# Patient Record
Sex: FEMALE | Race: WHITE | HISPANIC OR LATINO
[De-identification: names, ages, dates, MRNs, and addresses within clinical notes are randomized per-mention and may not be internally consistent; named-entity substitution may affect disease eponyms.]

---

## 2021-02-07 ENCOUNTER — FORM ENCOUNTER (OUTPATIENT)
Age: 71
End: 2021-02-07

## 2021-02-08 ENCOUNTER — TRANSCRIPTION ENCOUNTER (OUTPATIENT)
Age: 71
End: 2021-02-08

## 2021-02-08 PROBLEM — Z00.00 ENCOUNTER FOR PREVENTIVE HEALTH EXAMINATION: Status: ACTIVE | Noted: 2021-02-08

## 2021-02-09 ENCOUNTER — APPOINTMENT (OUTPATIENT)
Dept: DISASTER EMERGENCY | Facility: HOSPITAL | Age: 71
End: 2021-02-09

## 2021-02-09 ENCOUNTER — OUTPATIENT (OUTPATIENT)
Dept: INPATIENT UNIT | Facility: HOSPITAL | Age: 71
LOS: 1 days | End: 2021-02-09
Payer: SELF-PAY

## 2021-02-09 VITALS
OXYGEN SATURATION: 95 % | RESPIRATION RATE: 18 BRPM | TEMPERATURE: 98 F | DIASTOLIC BLOOD PRESSURE: 69 MMHG | SYSTOLIC BLOOD PRESSURE: 123 MMHG | HEART RATE: 60 BPM

## 2021-02-09 VITALS
HEART RATE: 84 BPM | SYSTOLIC BLOOD PRESSURE: 155 MMHG | HEIGHT: 62 IN | RESPIRATION RATE: 18 BRPM | WEIGHT: 160.06 LBS | TEMPERATURE: 98 F | OXYGEN SATURATION: 97 % | DIASTOLIC BLOOD PRESSURE: 80 MMHG

## 2021-02-09 DIAGNOSIS — U07.1 COVID-19: ICD-10-CM

## 2021-02-09 PROCEDURE — M0243: CPT

## 2021-02-09 RX ORDER — SODIUM CHLORIDE 9 MG/ML
250 INJECTION INTRAMUSCULAR; INTRAVENOUS; SUBCUTANEOUS
Refills: 0 | Status: DISCONTINUED | OUTPATIENT
Start: 2021-02-09 | End: 2021-02-23

## 2021-02-09 RX ADMIN — SODIUM CHLORIDE 25 MILLILITER(S): 9 INJECTION INTRAMUSCULAR; INTRAVENOUS; SUBCUTANEOUS at 09:51

## 2021-02-09 NOTE — CHART NOTE - NSCHARTNOTEFT_GEN_A_CORE
CC: Monoclonal Antibody Infusion/COVID 19 Positive  70y Female with a PMHx of HTN. Tested positive for COVID 2/8/21 with symptom onset 2/7/21. C/o cough, HA, and fatigue. She endorses receiving her first dose of the Pfizer vaccine on 1/22/21. She presents for monoclonal antibody infusion referred by her PMD.    exam/findings:  T(C): 36.5 (02-09-21 @ 09:23), Max: 36.5 (02-09-21 @ 09:23)  HR: 84 (02-09-21 @ 09:23) (84 - 84)  BP: 155/80 (02-09-21 @ 09:23) (155/80 - 155/80)  RR: 18 (02-09-21 @ 09:23) (18 - 18)  SpO2: 97% (02-09-21 @ 09:23) (97% - 97%)      PE:   Appearance: NAD	  HEENT:   Normal oral mucosa,   Lymphatic: No lymphadenopathy  Cardiovascular: Normal S1 S2, No JVD, No murmurs, No edema  Respiratory: Lungs clear to auscultation	  Gastrointestinal:  Soft, Non-tender, + BS	  Skin: warm and dry  Neurologic: Non-focal  Extremities: Normal range of motion,    ASSESSMENT:  Pt is a 70 year old female, Covid +  referred by Dr. Laurent who presents to infusion center for Monoclonal antibody infusion (Casirivimab/Imdevimab).   Symptoms/ Criteria: cough, HA, and fatigue  Risk Profile includes: age > 65    PLAN:  - infusion procedure explained to patient   -Consent for monoclonal antibody infusion obtained   - Risk & benifits discussed/all questions answered  -infuse  Casirivimab/Imdevimab 1200mg/1200mg IV over one hour   -observe patient for one hour post infusion        I have reviewed the Casirivimab/Imdevimab Emergency Use Authorization (EAU) and I have provided the patient or patient's caregiver with the following information:  1. FDA has authorized emergency use of Casirivimab/Imdevimab, which is not FDA-approved biologic product.  2. The patient or patient's caregiver has the option to accept or refuse administration of Casirivimab/Imdevimab  3. The significant known and benefits are unknown.  4. Information on available alternative treatments and risks and benefits of those alternatives.    Discharge:  Patient tolerated infusion well denies complaints of chest pain/SOB/dizziness/ palps  Vital signs --- for discharge home ---  D/C instructions given/ fact sheet included.  Patient to follow-up with PCP as needed. CC: Monoclonal Antibody Infusion/COVID 19 Positive  70y Female with a PMHx of HTN. Tested positive for COVID 2/8/21 with symptom onset 2/7/21. C/o cough, HA, and fatigue. She endorses receiving her first dose of the Pfizer vaccine on 1/22/21. She presents for monoclonal antibody infusion referred by her PMD.    exam/findings:  T(C): 36.5 (02-09-21 @ 09:23), Max: 36.5 (02-09-21 @ 09:23)  HR: 84 (02-09-21 @ 09:23) (84 - 84)  BP: 155/80 (02-09-21 @ 09:23) (155/80 - 155/80)  RR: 18 (02-09-21 @ 09:23) (18 - 18)  SpO2: 97% (02-09-21 @ 09:23) (97% - 97%)      PE:   Appearance: NAD	  HEENT:   Normal oral mucosa,   Lymphatic: No lymphadenopathy  Cardiovascular: Normal S1 S2, No JVD, No murmurs, No edema  Respiratory: Lungs clear to auscultation	  Gastrointestinal:  Soft, Non-tender, + BS	  Skin: warm and dry  Neurologic: Non-focal  Extremities: Normal range of motion,    ASSESSMENT:  Pt is a 70 year old female, Covid +  referred by Dr. Laurent who presents to infusion center for Monoclonal antibody infusion (Casirivimab/Imdevimab).   Symptoms/ Criteria: cough, HA, and fatigue  Risk Profile includes: age > 65    PLAN:  - infusion procedure explained to patient   -Consent for monoclonal antibody infusion obtained   - Risk & benefits discussed/all questions answered  -infuse  Casirivimab/Imdevimab 1200mg/1200mg IV over one hour   -observe patient for one hour post infusion        I have reviewed the Casirivimab/Imdevimab Emergency Use Authorization (EAU) and I have provided the patient or patient's caregiver with the following information:  1. FDA has authorized emergency use of Casirivimab/Imdevimab, which is not FDA-approved biologic product.  2. The patient or patient's caregiver has the option to accept or refuse administration of Casirivimab/Imdevimab  3. The significant known and benefits are unknown.  4. Information on available alternative treatments and risks and benefits of those alternatives.    Discharge:  Patient tolerated infusion well denies complaints of chest pain/SOB/dizziness/ palps  Vital signs stable for discharge home 1200.  D/C instructions given/ fact sheet included.  Patient to follow-up with PCP as needed.

## 2021-02-10 ENCOUNTER — TRANSCRIPTION ENCOUNTER (OUTPATIENT)
Age: 71
End: 2021-02-10

## 2022-11-14 ENCOUNTER — OFFICE (OUTPATIENT)
Dept: URBAN - METROPOLITAN AREA CLINIC 84 | Facility: CLINIC | Age: 72
Setting detail: OPHTHALMOLOGY
End: 2022-11-14
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
## 2022-11-14 PROCEDURE — NO SHOW FE NO SHOW FEE: Performed by: OPHTHALMOLOGY

## 2022-12-29 ENCOUNTER — OFFICE (OUTPATIENT)
Dept: URBAN - METROPOLITAN AREA CLINIC 93 | Facility: CLINIC | Age: 72
Setting detail: OPHTHALMOLOGY
End: 2022-12-29
Payer: COMMERCIAL

## 2022-12-29 ENCOUNTER — RX ONLY (RX ONLY)
Age: 72
End: 2022-12-29

## 2022-12-29 DIAGNOSIS — H26.492: ICD-10-CM

## 2022-12-29 DIAGNOSIS — H11.823: ICD-10-CM

## 2022-12-29 DIAGNOSIS — H02.89: ICD-10-CM

## 2022-12-29 DIAGNOSIS — H40.1121: ICD-10-CM

## 2022-12-29 DIAGNOSIS — H16.223: ICD-10-CM

## 2022-12-29 PROCEDURE — 99213 OFFICE O/P EST LOW 20 MIN: CPT | Performed by: OPHTHALMOLOGY

## 2022-12-29 PROCEDURE — 92133 CPTRZD OPH DX IMG PST SGM ON: CPT | Performed by: OPHTHALMOLOGY

## 2022-12-29 ASSESSMENT — REFRACTION_AUTOREFRACTION
OD_CYLINDER: -1.00
OS_SPHERE: +1.00
OS_AXIS: 036
OS_CYLINDER: -2.25
OD_SPHERE: +0.50
OD_AXIS: 124

## 2022-12-29 ASSESSMENT — DYE DISAPPEARANCE TEST (DDT)
OD_DDT: 9:23 AM 9:26AM9.28 AM
OS_DDT: 9:23 AM 9:26AM9.28 AM

## 2022-12-29 ASSESSMENT — DRY EYES - PHYSICIAN NOTES
OS_GENERALCOMMENTS: MODERATE STAINING
OD_GENERALCOMMENTS: MODERATE STAINING

## 2022-12-29 ASSESSMENT — KERATOMETRY
OD_K2POWER_DIOPTERS: 500145
OS_K2POWER_DIOPTERS: 44.50
OD_AXISANGLE_DEGREES: 091
OS_AXISANGLE_DEGREES: 091
OD_K1POWER_DIOPTERS: 41.754
OS_K1POWER_DIOPTERS: 41.50

## 2022-12-29 ASSESSMENT — SPHEQUIV_DERIVED
OS_SPHEQUIV: -0.125
OD_SPHEQUIV: 0

## 2022-12-29 ASSESSMENT — SUPERFICIAL PUNCTATE KERATITIS (SPK)
OD_SPK: 1+
OS_SPK: 4+

## 2022-12-29 ASSESSMENT — VISUAL ACUITY
OS_BCVA: 20/25-2
OD_BCVA: 20/40-1

## 2022-12-29 ASSESSMENT — LID EXAM ASSESSMENTS
OS_COMMENTS: W. SEVERE GLAND DROP OUT AND TRUNCATION
OD_MEIBOMITIS: 2+
OD_COMMENTS: W. MODERATE GLAND DROP OUT AND TRUNCATION
OS_MEIBOMITIS: 4+

## 2022-12-29 ASSESSMENT — AXIALLENGTH_DERIVED
OD_AL: 0.01
OS_AL: 23.8266

## 2022-12-29 ASSESSMENT — LACRIMAL DUCT - ASSESSMENT
OS_LACRIMAL_DUCT: #8 SILICONE INFERIOR PLUG REPLACED TODAY.
OD_LACRIMAL_DUCT: #8 SILICONE INFERIOR PLUG REPLACED TODAY.

## 2022-12-29 ASSESSMENT — CONFRONTATIONAL VISUAL FIELD TEST (CVF)
OD_FINDINGS: FULL
OS_FINDINGS: FULL

## 2022-12-29 ASSESSMENT — PUNCTA - ASSESSMENT
OS_PUNCTA: SIL PLUG LLL LARGE
OD_PUNCTA: SIL PLUG RLL LARGE

## 2023-01-24 ENCOUNTER — RX ONLY (RX ONLY)
Age: 73
End: 2023-01-24

## 2023-01-24 ENCOUNTER — OFFICE (OUTPATIENT)
Dept: URBAN - METROPOLITAN AREA CLINIC 93 | Facility: CLINIC | Age: 73
Setting detail: OPHTHALMOLOGY
End: 2023-01-24
Payer: MEDICARE

## 2023-01-24 DIAGNOSIS — H11.823: ICD-10-CM

## 2023-01-24 DIAGNOSIS — H16.223: ICD-10-CM

## 2023-01-24 DIAGNOSIS — H40.1121: ICD-10-CM

## 2023-01-24 DIAGNOSIS — H02.89: ICD-10-CM

## 2023-01-24 PROCEDURE — 92020 GONIOSCOPY: CPT | Performed by: OPHTHALMOLOGY

## 2023-01-24 PROCEDURE — 92083 EXTENDED VISUAL FIELD XM: CPT | Performed by: OPHTHALMOLOGY

## 2023-01-24 PROCEDURE — 92012 INTRM OPH EXAM EST PATIENT: CPT | Performed by: OPHTHALMOLOGY

## 2023-01-24 ASSESSMENT — VISUAL ACUITY
OS_BCVA: 20/25-2
OD_BCVA: 20/50-2

## 2023-01-24 ASSESSMENT — KERATOMETRY
OS_K2POWER_DIOPTERS: 44.25
OS_AXISANGLE_DEGREES: 085
OD_AXISANGLE_DEGREES: 085
OD_K1POWER_DIOPTERS: 42.00
OS_K1POWER_DIOPTERS: 41.50
OD_K2POWER_DIOPTERS: 44.50

## 2023-01-24 ASSESSMENT — SPHEQUIV_DERIVED
OS_SPHEQUIV: -0.25
OD_SPHEQUIV: 0.125

## 2023-01-24 ASSESSMENT — LACRIMAL DUCT - ASSESSMENT
OD_LACRIMAL_DUCT: #8 SILICONE INFERIOR PLUG REPLACED TODAY.
OS_LACRIMAL_DUCT: #8 SILICONE INFERIOR PLUG REPLACED TODAY.

## 2023-01-24 ASSESSMENT — TONOMETRY
OS_IOP_MMHG: 20
OD_IOP_MMHG: 15

## 2023-01-24 ASSESSMENT — LID EXAM ASSESSMENTS
OD_MEIBOMITIS: 2+
OD_COMMENTS: W. MODERATE GLAND DROP OUT AND TRUNCATION
OS_COMMENTS: W. SEVERE GLAND DROP OUT AND TRUNCATION
OS_MEIBOMITIS: 4+

## 2023-01-24 ASSESSMENT — CONFRONTATIONAL VISUAL FIELD TEST (CVF)
OD_FINDINGS: FULL
OS_FINDINGS: FULL

## 2023-01-24 ASSESSMENT — DRY EYES - PHYSICIAN NOTES
OS_GENERALCOMMENTS: MODERATE STAINING
OD_GENERALCOMMENTS: MODERATE STAINING

## 2023-01-24 ASSESSMENT — AXIALLENGTH_DERIVED
OD_AL: 23.6351
OS_AL: 23.9235

## 2023-01-24 ASSESSMENT — SUPERFICIAL PUNCTATE KERATITIS (SPK)
OD_SPK: 1+
OS_SPK: 4+

## 2023-01-24 ASSESSMENT — PUNCTA - ASSESSMENT
OD_PUNCTA: SIL PLUG RLL LARGE
OS_PUNCTA: SIL PLUG LLL LARGE

## 2023-01-24 ASSESSMENT — DYE DISAPPEARANCE TEST (DDT)
OS_DDT: 9:23 AM 9:26AM9.28 AM
OD_DDT: 9:23 AM 9:26AM9.28 AM

## 2023-01-24 ASSESSMENT — REFRACTION_AUTOREFRACTION
OS_SPHERE: +1.00
OD_SPHERE: +0.75
OS_AXIS: 036
OD_AXIS: 122
OD_CYLINDER: -1.25
OS_CYLINDER: -2.50

## 2023-01-27 ENCOUNTER — OFFICE (OUTPATIENT)
Dept: URBAN - METROPOLITAN AREA CLINIC 93 | Facility: CLINIC | Age: 73
Setting detail: OPHTHALMOLOGY
End: 2023-01-27
Payer: MEDICARE

## 2023-01-27 DIAGNOSIS — H40.1121: ICD-10-CM

## 2023-01-27 DIAGNOSIS — H40.1131: ICD-10-CM

## 2023-01-27 PROBLEM — H40.1111 POAG; RIGHT EYE MILD, LEFT EYE MILD: Status: ACTIVE | Noted: 2023-01-27

## 2023-01-27 PROCEDURE — 65855 TRABECULOPLASTY LASER SURG: CPT | Performed by: OPHTHALMOLOGY

## 2023-01-27 PROCEDURE — 76514 ECHO EXAM OF EYE THICKNESS: CPT | Performed by: OPHTHALMOLOGY

## 2023-01-27 PROCEDURE — RETCKFEE RETCHECKFEE: Performed by: OPHTHALMOLOGY

## 2023-01-27 ASSESSMENT — SPHEQUIV_DERIVED
OD_SPHEQUIV: 0.125
OS_SPHEQUIV: -0.5

## 2023-01-27 ASSESSMENT — CONFRONTATIONAL VISUAL FIELD TEST (CVF)
OD_FINDINGS: FULL
OS_FINDINGS: FULL

## 2023-01-27 ASSESSMENT — LID EXAM ASSESSMENTS
OS_MEIBOMITIS: 4+
OD_COMMENTS: W. MODERATE GLAND DROP OUT AND TRUNCATION
OS_COMMENTS: W. SEVERE GLAND DROP OUT AND TRUNCATION
OD_MEIBOMITIS: 2+

## 2023-01-27 ASSESSMENT — SUPERFICIAL PUNCTATE KERATITIS (SPK)
OS_SPK: 4+
OD_SPK: 1+

## 2023-01-27 ASSESSMENT — PACHYMETRY
OD_CT_CORRECTION: 2
OS_CT_CORRECTION: 2
OD_CT_UM: 513
OS_CT_UM: 515

## 2023-01-27 ASSESSMENT — DYE DISAPPEARANCE TEST (DDT)
OD_DDT: 9:23 AM 9:26AM9.28 AM
OS_DDT: 9:23 AM 9:26AM9.28 AM

## 2023-01-27 ASSESSMENT — REFRACTION_AUTOREFRACTION
OD_CYLINDER: -1.25
OD_AXIS: 122
OS_SPHERE: +0.75
OD_SPHERE: +0.75
OS_AXIS: 035
OS_CYLINDER: -2.50

## 2023-01-27 ASSESSMENT — VISUAL ACUITY
OS_BCVA: 20/25-1
OD_BCVA: 20/60

## 2023-01-27 ASSESSMENT — PUNCTA - ASSESSMENT
OD_PUNCTA: SIL PLUG RLL LARGE
OS_PUNCTA: SIL PLUG LLL LARGE

## 2023-01-27 ASSESSMENT — KERATOMETRY
OD_K2POWER_DIOPTERS: 44.50
OS_AXISANGLE_DEGREES: 084
OD_K1POWER_DIOPTERS: 41.75
OD_AXISANGLE_DEGREES: 084
OS_K2POWER_DIOPTERS: 44.50
OS_K1POWER_DIOPTERS: 41.50

## 2023-01-27 ASSESSMENT — TONOMETRY
OD_IOP_MMHG: 13
OS_IOP_MMHG: 14

## 2023-01-27 ASSESSMENT — AXIALLENGTH_DERIVED
OS_AL: 23.9765
OD_AL: 23.6813

## 2023-01-27 ASSESSMENT — DRY EYES - PHYSICIAN NOTES
OS_GENERALCOMMENTS: MODERATE STAINING
OD_GENERALCOMMENTS: MODERATE STAINING

## 2023-02-28 ENCOUNTER — OFFICE (OUTPATIENT)
Facility: LOCATION | Age: 73
Setting detail: OPHTHALMOLOGY
End: 2023-02-28
Payer: COMMERCIAL

## 2023-02-28 DIAGNOSIS — H11.823: ICD-10-CM

## 2023-02-28 DIAGNOSIS — H52.4: ICD-10-CM

## 2023-02-28 DIAGNOSIS — H02.89: ICD-10-CM

## 2023-02-28 PROCEDURE — 92015 DETERMINE REFRACTIVE STATE: CPT | Performed by: OPHTHALMOLOGY

## 2023-02-28 PROCEDURE — 99213 OFFICE O/P EST LOW 20 MIN: CPT | Performed by: OPHTHALMOLOGY

## 2023-02-28 ASSESSMENT — LID EXAM ASSESSMENTS
OS_COMMENTS: W. SEVERE GLAND DROP OUT AND TRUNCATION
OD_MEIBOMITIS: 2+
OS_MEIBOMITIS: 4+
OD_COMMENTS: W. MODERATE GLAND DROP OUT AND TRUNCATION

## 2023-02-28 ASSESSMENT — REFRACTION_AUTOREFRACTION
OS_AXIS: 034
OD_CYLINDER: -1.75
OS_CYLINDER: -2.75
OS_SPHERE: +0.75
OD_AXIS: 120
OD_SPHERE: +0.75

## 2023-02-28 ASSESSMENT — SUPERFICIAL PUNCTATE KERATITIS (SPK)
OS_SPK: 4+
OD_SPK: 1+

## 2023-02-28 ASSESSMENT — REFRACTION_MANIFEST
OD_ADD: +2.75
OD_CYLINDER: -1.75
OD_VA1: 20/30
OS_SPHERE: +0.75
OD_SPHERE: +0.75
OS_VA1: 20/30
OD_AXIS: 120
OS_ADD: +2.75
OS_AXIS: 035
OS_CYLINDER: -2.75

## 2023-02-28 ASSESSMENT — REFRACTION_CURRENTRX
OD_SPHERE: +3.50
OD_AXIS: 075
OD_OVR_VA: 20/
OS_SPHERE: +3.25
OS_CYLINDER: -1.00
OS_AXIS: 89
OD_CYLINDER: -2.00
OS_OVR_VA: 20/

## 2023-02-28 ASSESSMENT — VISUAL ACUITY
OD_BCVA: 20/50-2
OS_BCVA: 20/25-2

## 2023-02-28 ASSESSMENT — CONFRONTATIONAL VISUAL FIELD TEST (CVF)
OD_FINDINGS: FULL
OS_FINDINGS: FULL

## 2023-02-28 ASSESSMENT — DYE DISAPPEARANCE TEST (DDT)
OS_DDT: 9:23 AM 9:26AM9.28 AM
OD_DDT: 9:23 AM 9:26AM9.28 AM

## 2023-02-28 ASSESSMENT — SPHEQUIV_DERIVED
OS_SPHEQUIV: -0.625
OS_SPHEQUIV: -0.625
OD_SPHEQUIV: -0.125
OD_SPHEQUIV: -0.125

## 2023-02-28 ASSESSMENT — DRY EYES - PHYSICIAN NOTES
OS_GENERALCOMMENTS: MODERATE STAINING
OD_GENERALCOMMENTS: MODERATE STAINING

## 2023-02-28 ASSESSMENT — PUNCTA - ASSESSMENT
OD_PUNCTA: SIL PLUG RLL LARGE
OS_PUNCTA: SIL PLUG LLL LARGE

## 2023-03-28 ENCOUNTER — OFFICE (OUTPATIENT)
Facility: LOCATION | Age: 73
Setting detail: OPHTHALMOLOGY
End: 2023-03-28
Payer: COMMERCIAL

## 2023-03-28 DIAGNOSIS — H52.4: ICD-10-CM

## 2023-03-28 DIAGNOSIS — H40.1111: ICD-10-CM

## 2023-03-28 DIAGNOSIS — H40.1121: ICD-10-CM

## 2023-03-28 DIAGNOSIS — H11.823: ICD-10-CM

## 2023-03-28 DIAGNOSIS — H26.492: ICD-10-CM

## 2023-03-28 DIAGNOSIS — H02.89: ICD-10-CM

## 2023-03-28 DIAGNOSIS — H16.223: ICD-10-CM

## 2023-03-28 PROCEDURE — 92012 INTRM OPH EXAM EST PATIENT: CPT | Performed by: OPHTHALMOLOGY

## 2023-03-28 ASSESSMENT — AXIALLENGTH_DERIVED
OD_AL: 23.7248
OS_AL: 24.365
OS_AL: 24.2615
OD_AL: 23.8735

## 2023-03-28 ASSESSMENT — REFRACTION_CURRENTRX
OD_SPHERE: +0.75
OS_SPHERE: +1.00
OD_AXIS: 125
OS_CYLINDER: -3.25
OS_AXIS: 045
OD_CYLINDER: -1.75
OS_SPHERE: +1.50
OD_OVR_VA: 20/
OD_CYLINDER: -0.75
OS_CYLINDER: -1.25
OD_OVR_VA: 20/
OD_SPHERE: +0.75
OS_OVR_VA: 20/
OS_OVR_VA: 20/
OD_AXIS: 110
OS_AXIS: 035

## 2023-03-28 ASSESSMENT — PACHYMETRY
OD_CT_CORRECTION: 2
OS_CT_UM: 515
OS_CT_CORRECTION: 2
OD_CT_UM: 513

## 2023-03-28 ASSESSMENT — LID EXAM ASSESSMENTS
OS_MEIBOMITIS: 4+
OD_COMMENTS: W. MODERATE GLAND DROP OUT AND TRUNCATION
OD_MEIBOMITIS: 2+
OS_COMMENTS: W. SEVERE GLAND DROP OUT AND TRUNCATION

## 2023-03-28 ASSESSMENT — KERATOMETRY
OD_AXISANGLE_DEGREES: 083
OS_K1POWER_DIOPTERS: 41.00
OS_K2POWER_DIOPTERS: 43.25
OS_AXISANGLE_DEGREES: 086
OD_K2POWER_DIOPTERS: 44.25
OD_K1POWER_DIOPTERS: 41.50

## 2023-03-28 ASSESSMENT — REFRACTION_MANIFEST
OD_CYLINDER: -1.75
OS_SPHERE: +0.75
OD_ADD: +2.75
OS_VA1: 20/30
OS_AXIS: 035
OS_ADD: +2.75
OS_CYLINDER: -2.75
OD_VA1: 20/30
OD_AXIS: 120
OD_SPHERE: +0.75

## 2023-03-28 ASSESSMENT — SUPERFICIAL PUNCTATE KERATITIS (SPK)
OS_SPK: 4+
OD_SPK: 1+

## 2023-03-28 ASSESSMENT — PUNCTA - ASSESSMENT
OS_PUNCTA: SIL PLUG LLL LARGE
OD_PUNCTA: SIL PLUG RLL LARGE

## 2023-03-28 ASSESSMENT — DYE DISAPPEARANCE TEST (DDT)
OS_DDT: 9:23 AM 9:26AM9.28 AM
OD_DDT: 9:23 AM 9:26AM9.28 AM

## 2023-03-28 ASSESSMENT — TONOMETRY: OD_IOP_MMHG: 15

## 2023-03-28 ASSESSMENT — CONFRONTATIONAL VISUAL FIELD TEST (CVF)
OD_FINDINGS: FULL
OS_FINDINGS: FULL

## 2023-03-28 ASSESSMENT — REFRACTION_AUTOREFRACTION
OD_SPHERE: +1.00
OS_AXIS: 037
OD_CYLINDER: -1.50
OS_SPHERE: +1.00
OS_CYLINDER: -2.75
OD_AXIS: 114

## 2023-03-28 ASSESSMENT — SPHEQUIV_DERIVED
OD_SPHEQUIV: -0.125
OS_SPHEQUIV: -0.375
OD_SPHEQUIV: 0.25
OS_SPHEQUIV: -0.625

## 2023-03-28 ASSESSMENT — DRY EYES - PHYSICIAN NOTES
OS_GENERALCOMMENTS: MODERATE STAINING
OD_GENERALCOMMENTS: MODERATE STAINING

## 2023-03-28 ASSESSMENT — VISUAL ACUITY
OD_BCVA: 20/30+2
OS_BCVA: 20/20-1

## 2023-04-01 ENCOUNTER — RX ONLY (RX ONLY)
Age: 73
End: 2023-04-01

## 2023-04-01 ENCOUNTER — OFFICE (OUTPATIENT)
Facility: LOCATION | Age: 73
Setting detail: OPHTHALMOLOGY
End: 2023-04-01
Payer: COMMERCIAL

## 2023-04-01 DIAGNOSIS — H02.89: ICD-10-CM

## 2023-04-01 DIAGNOSIS — H26.492: ICD-10-CM

## 2023-04-01 DIAGNOSIS — H52.4: ICD-10-CM

## 2023-04-01 DIAGNOSIS — H11.823: ICD-10-CM

## 2023-04-01 DIAGNOSIS — H16.223: ICD-10-CM

## 2023-04-01 DIAGNOSIS — H40.1121: ICD-10-CM

## 2023-04-01 DIAGNOSIS — H40.1111: ICD-10-CM

## 2023-04-01 PROCEDURE — 92133 CPTRZD OPH DX IMG PST SGM ON: CPT | Performed by: OPHTHALMOLOGY

## 2023-04-01 PROCEDURE — 99213 OFFICE O/P EST LOW 20 MIN: CPT | Performed by: OPHTHALMOLOGY

## 2023-04-01 ASSESSMENT — REFRACTION_CURRENTRX
OS_CYLINDER: -1.25
OS_AXIS: 045
OD_CYLINDER: -0.75
OS_OVR_VA: 20/
OS_SPHERE: +1.50
OS_AXIS: 035
OS_OVR_VA: 20/
OD_AXIS: 125
OD_AXIS: 110
OD_SPHERE: +0.75
OD_CYLINDER: -1.75
OS_CYLINDER: -3.25
OD_OVR_VA: 20/
OD_OVR_VA: 20/
OS_SPHERE: +1.00
OD_SPHERE: +0.75

## 2023-04-01 ASSESSMENT — REFRACTION_AUTOREFRACTION
OS_CYLINDER: -2.50
OD_CYLINDER: -1.50
OD_SPHERE: +1.00
OD_AXIS: 118
OS_SPHERE: +1.00
OS_AXIS: 031

## 2023-04-01 ASSESSMENT — SPHEQUIV_DERIVED
OS_SPHEQUIV: -0.25
OD_SPHEQUIV: 0.25
OD_SPHEQUIV: -0.125
OS_SPHEQUIV: -0.625

## 2023-04-01 ASSESSMENT — VISUAL ACUITY
OD_BCVA: 20/40-2
OS_BCVA: 20/20-1

## 2023-04-01 ASSESSMENT — REFRACTION_MANIFEST
OS_ADD: +2.75
OD_AXIS: 120
OS_CYLINDER: -2.75
OD_ADD: +2.75
OD_VA1: 20/30
OS_VA1: 20/30
OS_AXIS: 035
OD_SPHERE: +0.75
OS_SPHERE: +0.75
OD_CYLINDER: -1.75

## 2023-04-01 ASSESSMENT — PUNCTA - ASSESSMENT
OD_PUNCTA: SIL PLUG RLL LARGE
OS_PUNCTA: SIL PLUG LLL LARGE

## 2023-04-01 ASSESSMENT — CONFRONTATIONAL VISUAL FIELD TEST (CVF)
OS_FINDINGS: FULL
OD_FINDINGS: FULL

## 2023-04-01 ASSESSMENT — PACHYMETRY
OD_CT_CORRECTION: 2
OS_CT_UM: 515
OS_CT_CORRECTION: 2
OD_CT_UM: 513

## 2023-04-01 ASSESSMENT — KERATOMETRY
OD_K1POWER_DIOPTERS: 41.50
OS_K2POWER_DIOPTERS: 43.25
OS_AXISANGLE_DEGREES: 086
OS_K1POWER_DIOPTERS: 41.00
OD_AXISANGLE_DEGREES: 083
OD_K2POWER_DIOPTERS: 44.25

## 2023-04-01 ASSESSMENT — AXIALLENGTH_DERIVED
OD_AL: 23.7248
OS_AL: 24.2101
OD_AL: 23.8735
OS_AL: 24.365

## 2023-04-01 ASSESSMENT — DRY EYES - PHYSICIAN NOTES
OD_GENERALCOMMENTS: MODERATE STAINING
OS_GENERALCOMMENTS: MODERATE STAINING

## 2023-04-01 ASSESSMENT — DYE DISAPPEARANCE TEST (DDT)
OS_DDT: 9:23 AM 9:26AM9.28 AM
OD_DDT: 9:23 AM 9:26AM9.28 AM

## 2023-04-01 ASSESSMENT — LID EXAM ASSESSMENTS
OD_MEIBOMITIS: 2+
OS_COMMENTS: W. SEVERE GLAND DROP OUT AND TRUNCATION
OS_MEIBOMITIS: 4+
OD_COMMENTS: W. MODERATE GLAND DROP OUT AND TRUNCATION

## 2023-04-01 ASSESSMENT — SUPERFICIAL PUNCTATE KERATITIS (SPK)
OS_SPK: 4+
OD_SPK: 1+

## 2023-04-01 ASSESSMENT — TONOMETRY
OD_IOP_MMHG: 13
OS_IOP_MMHG: 21

## 2023-04-13 ENCOUNTER — OFFICE (OUTPATIENT)
Facility: LOCATION | Age: 73
Setting detail: OPHTHALMOLOGY
End: 2023-04-13
Payer: COMMERCIAL

## 2023-04-13 DIAGNOSIS — H40.1111: ICD-10-CM

## 2023-04-13 DIAGNOSIS — H40.1123: ICD-10-CM

## 2023-04-13 PROCEDURE — 99214 OFFICE O/P EST MOD 30 MIN: CPT | Performed by: OPHTHALMOLOGY

## 2023-04-13 ASSESSMENT — SPHEQUIV_DERIVED
OS_SPHEQUIV: -0.25
OD_SPHEQUIV: 0.25
OD_SPHEQUIV: -0.125
OS_SPHEQUIV: -0.625

## 2023-04-13 ASSESSMENT — DRY EYES - PHYSICIAN NOTES
OD_GENERALCOMMENTS: MODERATE STAINING
OS_GENERALCOMMENTS: MODERATE STAINING

## 2023-04-13 ASSESSMENT — TONOMETRY: OD_IOP_MMHG: 18

## 2023-04-13 ASSESSMENT — LID EXAM ASSESSMENTS
OS_MEIBOMITIS: 4+
OS_COMMENTS: W. SEVERE GLAND DROP OUT AND TRUNCATION
OD_MEIBOMITIS: 2+
OD_COMMENTS: W. MODERATE GLAND DROP OUT AND TRUNCATION

## 2023-04-13 ASSESSMENT — REFRACTION_MANIFEST
OD_AXIS: 120
OS_AXIS: 035
OD_ADD: +2.75
OD_VA1: 20/30
OS_SPHERE: +0.75
OS_CYLINDER: -2.75
OD_SPHERE: +0.75
OD_CYLINDER: -1.75
OS_VA1: 20/30
OS_ADD: +2.75

## 2023-04-13 ASSESSMENT — REFRACTION_CURRENTRX
OS_SPHERE: +1.00
OS_CYLINDER: -1.25
OS_AXIS: 035
OD_OVR_VA: 20/
OS_SPHERE: +1.50
OD_AXIS: 125
OS_CYLINDER: -3.25
OD_CYLINDER: -1.75
OD_SPHERE: +0.75
OD_CYLINDER: -0.75
OS_AXIS: 045
OS_OVR_VA: 20/
OD_AXIS: 110
OD_OVR_VA: 20/
OD_SPHERE: +0.75
OS_OVR_VA: 20/

## 2023-04-13 ASSESSMENT — REFRACTION_AUTOREFRACTION
OD_AXIS: 118
OD_CYLINDER: -1.50
OS_SPHERE: +1.00
OD_SPHERE: +1.00
OS_AXIS: 031
OS_CYLINDER: -2.50

## 2023-04-13 ASSESSMENT — PACHYMETRY
OS_CT_CORRECTION: 2
OS_CT_UM: 515
OD_CT_CORRECTION: 2
OD_CT_UM: 513

## 2023-04-13 ASSESSMENT — AXIALLENGTH_DERIVED
OS_AL: 24.2101
OD_AL: 23.8735
OS_AL: 24.365
OD_AL: 23.7248

## 2023-04-13 ASSESSMENT — DYE DISAPPEARANCE TEST (DDT)
OD_DDT: 9:23 AM 9:26AM9.28 AM
OS_DDT: 9:23 AM 9:26AM9.28 AM

## 2023-04-13 ASSESSMENT — KERATOMETRY
OD_K1POWER_DIOPTERS: 41.50
OD_K2POWER_DIOPTERS: 44.25
OS_K2POWER_DIOPTERS: 43.25
OS_AXISANGLE_DEGREES: 086
OD_AXISANGLE_DEGREES: 083
OS_K1POWER_DIOPTERS: 41.00

## 2023-04-13 ASSESSMENT — PUNCTA - ASSESSMENT
OS_PUNCTA: SIL PLUG LLL LARGE
OD_PUNCTA: SIL PLUG RLL LARGE

## 2023-04-13 ASSESSMENT — VISUAL ACUITY
OS_BCVA: 20/20-1
OD_BCVA: 20/40-3

## 2023-04-13 ASSESSMENT — CONFRONTATIONAL VISUAL FIELD TEST (CVF)
OS_FINDINGS: FULL
OD_FINDINGS: FULL

## 2023-04-13 ASSESSMENT — SUPERFICIAL PUNCTATE KERATITIS (SPK)
OD_SPK: 1+
OS_SPK: 4+

## 2023-04-21 ENCOUNTER — RX ONLY (RX ONLY)
Age: 73
End: 2023-04-21

## 2023-04-21 ENCOUNTER — OFFICE (OUTPATIENT)
Facility: LOCATION | Age: 73
Setting detail: OPHTHALMOLOGY
End: 2023-04-21
Payer: COMMERCIAL

## 2023-04-21 DIAGNOSIS — H40.1111: ICD-10-CM

## 2023-04-21 DIAGNOSIS — H40.1123: ICD-10-CM

## 2023-04-21 PROCEDURE — 99214 OFFICE O/P EST MOD 30 MIN: CPT | Performed by: OPHTHALMOLOGY

## 2023-04-21 ASSESSMENT — SUPERFICIAL PUNCTATE KERATITIS (SPK)
OS_SPK: 4+
OD_SPK: 1+

## 2023-04-21 ASSESSMENT — PUNCTA - ASSESSMENT
OS_PUNCTA: SIL PLUG LLL LARGE
OD_PUNCTA: SIL PLUG RLL LARGE

## 2023-04-21 ASSESSMENT — DRY EYES - PHYSICIAN NOTES
OS_GENERALCOMMENTS: MODERATE STAINING
OD_GENERALCOMMENTS: MODERATE STAINING

## 2023-04-21 ASSESSMENT — DYE DISAPPEARANCE TEST (DDT)
OS_DDT: 9:23 AM 9:26AM9.28 AM
OD_DDT: 9:23 AM 9:26AM9.28 AM

## 2023-04-21 ASSESSMENT — REFRACTION_CURRENTRX
OS_SPHERE: +1.00
OD_CYLINDER: -0.75
OS_AXIS: 035
OS_SPHERE: +1.50
OS_CYLINDER: -1.25
OD_AXIS: 110
OD_AXIS: 125
OD_CYLINDER: -1.75
OD_SPHERE: +0.75
OS_CYLINDER: -3.25
OS_OVR_VA: 20/
OD_SPHERE: +0.75
OD_OVR_VA: 20/
OD_OVR_VA: 20/
OS_AXIS: 045
OS_OVR_VA: 20/

## 2023-04-21 ASSESSMENT — REFRACTION_MANIFEST
OD_CYLINDER: -1.75
OS_CYLINDER: -2.75
OS_AXIS: 035
OD_VA1: 20/30
OS_SPHERE: +0.75
OD_SPHERE: +0.75
OS_VA1: 20/30
OS_ADD: +2.75
OD_ADD: +2.75
OD_AXIS: 120

## 2023-04-21 ASSESSMENT — LID EXAM ASSESSMENTS
OS_MEIBOMITIS: 4+
OD_MEIBOMITIS: 2+
OD_COMMENTS: W. MODERATE GLAND DROP OUT AND TRUNCATION
OS_COMMENTS: W. SEVERE GLAND DROP OUT AND TRUNCATION

## 2023-04-21 ASSESSMENT — TONOMETRY
OS_IOP_MMHG: 12
OD_IOP_MMHG: 14

## 2023-04-21 ASSESSMENT — SPHEQUIV_DERIVED
OS_SPHEQUIV: -0.625
OD_SPHEQUIV: -0.125
OS_SPHEQUIV: -0.25
OD_SPHEQUIV: 0.25

## 2023-04-21 ASSESSMENT — KERATOMETRY
OD_K1POWER_DIOPTERS: 41.50
OS_K1POWER_DIOPTERS: 41.00
OS_K2POWER_DIOPTERS: 43.25
OD_AXISANGLE_DEGREES: 083
OS_AXISANGLE_DEGREES: 086
OD_K2POWER_DIOPTERS: 44.25

## 2023-04-21 ASSESSMENT — REFRACTION_AUTOREFRACTION
OD_AXIS: 118
OD_SPHERE: +1.00
OD_CYLINDER: -1.50
OS_AXIS: 031
OS_SPHERE: +1.00
OS_CYLINDER: -2.50

## 2023-04-21 ASSESSMENT — PACHYMETRY
OS_CT_CORRECTION: 2
OD_CT_UM: 513
OS_CT_UM: 515
OD_CT_CORRECTION: 2

## 2023-04-21 ASSESSMENT — AXIALLENGTH_DERIVED
OS_AL: 24.365
OS_AL: 24.2101
OD_AL: 23.7248
OD_AL: 23.8735

## 2023-04-21 ASSESSMENT — VISUAL ACUITY
OD_BCVA: 20/40-3
OS_BCVA: 20/20-1

## 2023-05-04 ENCOUNTER — ASC (OUTPATIENT)
Dept: URBAN - METROPOLITAN AREA SURGERY 8 | Facility: SURGERY | Age: 73
Setting detail: OPHTHALMOLOGY
End: 2023-05-04
Payer: MEDICARE

## 2023-05-04 DIAGNOSIS — H40.1123: ICD-10-CM

## 2023-05-04 PROCEDURE — 66180 AQUEOUS SHUNT EYE W/GRAFT: CPT | Performed by: OPHTHALMOLOGY

## 2023-05-05 ENCOUNTER — OFFICE (OUTPATIENT)
Facility: LOCATION | Age: 73
Setting detail: OPHTHALMOLOGY
End: 2023-05-05
Payer: MEDICARE

## 2023-05-05 DIAGNOSIS — H40.1123: ICD-10-CM

## 2023-05-05 DIAGNOSIS — H40.1111: ICD-10-CM

## 2023-05-05 PROCEDURE — 99024 POSTOP FOLLOW-UP VISIT: CPT | Performed by: OPHTHALMOLOGY

## 2023-05-05 ASSESSMENT — SPHEQUIV_DERIVED
OD_SPHEQUIV: -0.125
OS_SPHEQUIV: 0
OS_SPHEQUIV: -0.625
OD_SPHEQUIV: 0.375

## 2023-05-05 ASSESSMENT — KERATOMETRY
OS_K2POWER_DIOPTERS: 44.00
OD_K1POWER_DIOPTERS: 41.75
OS_AXISANGLE_DEGREES: 080
OS_K1POWER_DIOPTERS: 40.50
OD_K2POWER_DIOPTERS: 44.25
OD_AXISANGLE_DEGREES: 085

## 2023-05-05 ASSESSMENT — PACHYMETRY
OS_CT_UM: 515
OS_CT_CORRECTION: 2
OD_CT_CORRECTION: 2
OD_CT_UM: 513

## 2023-05-05 ASSESSMENT — LID EXAM ASSESSMENTS
OD_MEIBOMITIS: 2+
OD_COMMENTS: W. MODERATE GLAND DROP OUT AND TRUNCATION
OS_MEIBOMITIS: 4+
OS_COMMENTS: W. SEVERE GLAND DROP OUT AND TRUNCATION

## 2023-05-05 ASSESSMENT — VISUAL ACUITY
OD_BCVA: 20/60+2
OS_BCVA: 20/

## 2023-05-05 ASSESSMENT — REFRACTION_AUTOREFRACTION
OD_SPHERE: +1.25
OD_AXIS: 119
OS_SPHERE: +1.00
OS_AXIS: 021
OD_CYLINDER: -1.75
OS_CYLINDER: -2.00

## 2023-05-05 ASSESSMENT — REFRACTION_CURRENTRX
OD_CYLINDER: -0.75
OS_OVR_VA: 20/
OD_SPHERE: +0.75
OS_AXIS: 045
OD_OVR_VA: 20/
OD_CYLINDER: -1.75
OS_AXIS: 035
OD_AXIS: 125
OD_OVR_VA: 20/
OD_AXIS: 110
OS_CYLINDER: -3.25
OD_SPHERE: +0.75
OS_SPHERE: +1.00
OS_SPHERE: +1.50
OS_CYLINDER: -1.25
OS_OVR_VA: 20/

## 2023-05-05 ASSESSMENT — REFRACTION_MANIFEST
OS_SPHERE: +0.75
OS_VA1: 20/30
OS_AXIS: 035
OD_VA1: 20/30
OD_ADD: +2.75
OD_AXIS: 120
OS_ADD: +2.75
OD_CYLINDER: -1.75
OD_SPHERE: +0.75
OS_CYLINDER: -2.75

## 2023-05-05 ASSESSMENT — PUNCTA - ASSESSMENT
OS_PUNCTA: SIL PLUG LLL LARGE
OD_PUNCTA: SIL PLUG RLL LARGE

## 2023-05-05 ASSESSMENT — DRY EYES - PHYSICIAN NOTES
OS_GENERALCOMMENTS: MODERATE STAINING
OD_GENERALCOMMENTS: MODERATE STAINING

## 2023-05-05 ASSESSMENT — SUPERFICIAL PUNCTATE KERATITIS (SPK)
OS_SPK: 4+
OD_SPK: 1+

## 2023-05-05 ASSESSMENT — AXIALLENGTH_DERIVED
OS_AL: 24.0602
OD_AL: 23.8266
OD_AL: 23.6295
OS_AL: 24.3161

## 2023-05-05 ASSESSMENT — DYE DISAPPEARANCE TEST (DDT)
OD_DDT: 9:23 AM 9:26AM9.28 AM
OS_DDT: 9:23 AM 9:26AM9.28 AM

## 2023-05-05 ASSESSMENT — TONOMETRY: OD_IOP_MMHG: 13

## 2023-05-05 ASSESSMENT — CONFRONTATIONAL VISUAL FIELD TEST (CVF)
OD_FINDINGS: FULL
OS_FINDINGS: FULL

## 2023-05-12 ENCOUNTER — RX ONLY (RX ONLY)
Age: 73
End: 2023-05-12

## 2023-05-12 ENCOUNTER — OFFICE (OUTPATIENT)
Facility: LOCATION | Age: 73
Setting detail: OPHTHALMOLOGY
End: 2023-05-12
Payer: MEDICARE

## 2023-05-12 DIAGNOSIS — H40.1111: ICD-10-CM

## 2023-05-12 DIAGNOSIS — H40.1123: ICD-10-CM

## 2023-05-12 PROCEDURE — 99024 POSTOP FOLLOW-UP VISIT: CPT | Performed by: OPHTHALMOLOGY

## 2023-05-12 ASSESSMENT — CONFRONTATIONAL VISUAL FIELD TEST (CVF)
OS_FINDINGS: FULL
OD_FINDINGS: FULL

## 2023-05-12 ASSESSMENT — REFRACTION_CURRENTRX
OS_OVR_VA: 20/
OD_CYLINDER: -1.75
OS_AXIS: 035
OS_SPHERE: +1.00
OS_AXIS: 045
OS_CYLINDER: -1.25
OS_CYLINDER: -3.25
OD_AXIS: 110
OD_CYLINDER: -0.75
OD_OVR_VA: 20/
OD_AXIS: 125
OD_OVR_VA: 20/
OS_SPHERE: +1.50
OD_SPHERE: +0.75
OD_SPHERE: +0.75
OS_OVR_VA: 20/

## 2023-05-12 ASSESSMENT — PUNCTA - ASSESSMENT
OS_PUNCTA: SIL PLUG LLL LARGE
OD_PUNCTA: SIL PLUG RLL LARGE

## 2023-05-12 ASSESSMENT — SPHEQUIV_DERIVED
OS_SPHEQUIV: 0.5
OS_SPHEQUIV: -0.625
OD_SPHEQUIV: 0.625
OD_SPHEQUIV: -0.125

## 2023-05-12 ASSESSMENT — REFRACTION_AUTOREFRACTION
OS_AXIS: 043
OD_CYLINDER: -1.25
OD_AXIS: 117
OS_CYLINDER: -2.50
OS_SPHERE: +1.75
OD_SPHERE: +1.25

## 2023-05-12 ASSESSMENT — KERATOMETRY
OS_AXISANGLE_DEGREES: 079
OS_K1POWER_DIOPTERS: 41.00
OD_K1POWER_DIOPTERS: 41.25
OS_K2POWER_DIOPTERS: 43.75
OD_AXISANGLE_DEGREES: 083
OD_K2POWER_DIOPTERS: 43.75

## 2023-05-12 ASSESSMENT — REFRACTION_MANIFEST
OD_CYLINDER: -1.75
OD_ADD: +2.75
OD_AXIS: 120
OS_AXIS: 035
OD_VA1: 20/30
OS_SPHERE: +0.75
OS_VA1: 20/30
OS_ADD: +2.75
OD_SPHERE: +0.75
OS_CYLINDER: -2.75

## 2023-05-12 ASSESSMENT — AXIALLENGTH_DERIVED
OS_AL: 23.8124
OD_AL: 23.7164
OD_AL: 24.0154
OS_AL: 24.2674

## 2023-05-12 ASSESSMENT — PACHYMETRY
OD_CT_CORRECTION: 2
OS_CT_CORRECTION: 2
OS_CT_UM: 515
OD_CT_UM: 513

## 2023-05-12 ASSESSMENT — SUPERFICIAL PUNCTATE KERATITIS (SPK)
OS_SPK: 4+
OD_SPK: 1+

## 2023-05-12 ASSESSMENT — LID EXAM ASSESSMENTS
OD_COMMENTS: W. MODERATE GLAND DROP OUT AND TRUNCATION
OS_COMMENTS: W. SEVERE GLAND DROP OUT AND TRUNCATION
OD_MEIBOMITIS: 2+
OS_MEIBOMITIS: 4+

## 2023-05-12 ASSESSMENT — DRY EYES - PHYSICIAN NOTES
OD_GENERALCOMMENTS: MODERATE STAINING
OS_GENERALCOMMENTS: MODERATE STAINING

## 2023-05-12 ASSESSMENT — DYE DISAPPEARANCE TEST (DDT)
OS_DDT: 9:23 AM 9:26AM9.28 AM
OD_DDT: 9:23 AM 9:26AM9.28 AM

## 2023-05-12 ASSESSMENT — TONOMETRY
OD_IOP_MMHG: 10
OS_IOP_MMHG: 13

## 2023-05-12 ASSESSMENT — VISUAL ACUITY
OD_BCVA: 20/60
OS_BCVA: 20/25+

## 2023-05-31 ENCOUNTER — OFFICE (OUTPATIENT)
Facility: LOCATION | Age: 73
Setting detail: OPHTHALMOLOGY
End: 2023-05-31
Payer: MEDICARE

## 2023-05-31 DIAGNOSIS — H40.1111: ICD-10-CM

## 2023-05-31 DIAGNOSIS — H40.1123: ICD-10-CM

## 2023-05-31 PROCEDURE — 99024 POSTOP FOLLOW-UP VISIT: CPT | Performed by: OPHTHALMOLOGY

## 2023-05-31 ASSESSMENT — REFRACTION_CURRENTRX
OD_OVR_VA: 20/
OD_AXIS: 110
OD_SPHERE: +0.75
OD_OVR_VA: 20/
OS_OVR_VA: 20/
OD_CYLINDER: -1.75
OS_CYLINDER: -3.25
OS_CYLINDER: -1.25
OS_OVR_VA: 20/
OD_CYLINDER: -0.75
OS_SPHERE: +1.50
OS_AXIS: 035
OS_AXIS: 045
OD_SPHERE: +0.75
OD_AXIS: 125
OS_SPHERE: +1.00

## 2023-05-31 ASSESSMENT — TONOMETRY
OS_IOP_MMHG: 20
OD_IOP_MMHG: 10

## 2023-05-31 ASSESSMENT — REFRACTION_AUTOREFRACTION
OD_SPHERE: +1.25
OD_AXIS: 116
OS_CYLINDER: -3.25
OD_CYLINDER: -1.25
OS_SPHERE: +0.50
OS_AXIS: 022

## 2023-05-31 ASSESSMENT — PACHYMETRY
OD_CT_CORRECTION: 2
OS_CT_CORRECTION: 2
OS_CT_UM: 515
OD_CT_UM: 513

## 2023-05-31 ASSESSMENT — REFRACTION_MANIFEST
OD_AXIS: 120
OD_CYLINDER: -1.75
OD_VA1: 20/30
OD_SPHERE: +0.75
OS_AXIS: 035
OS_CYLINDER: -2.75
OD_ADD: +2.75
OS_VA1: 20/30
OS_ADD: +2.75
OS_SPHERE: +0.75

## 2023-05-31 ASSESSMENT — DRY EYES - PHYSICIAN NOTES
OS_GENERALCOMMENTS: MODERATE STAINING
OD_GENERALCOMMENTS: MODERATE STAINING

## 2023-05-31 ASSESSMENT — SUPERFICIAL PUNCTATE KERATITIS (SPK)
OS_SPK: 4+
OD_SPK: 1+

## 2023-05-31 ASSESSMENT — VISUAL ACUITY
OS_BCVA: 20/25-1
OD_BCVA: 20/30-1

## 2023-05-31 ASSESSMENT — CONFRONTATIONAL VISUAL FIELD TEST (CVF)
OD_FINDINGS: FULL
OS_FINDINGS: FULL

## 2023-05-31 ASSESSMENT — SPHEQUIV_DERIVED
OD_SPHEQUIV: -0.125
OD_SPHEQUIV: 0.625
OS_SPHEQUIV: -0.625
OS_SPHEQUIV: -1.125

## 2023-05-31 ASSESSMENT — PUNCTA - ASSESSMENT
OS_PUNCTA: SIL PLUG LLL LARGE
OD_PUNCTA: SIL PLUG RLL LARGE

## 2023-05-31 ASSESSMENT — DYE DISAPPEARANCE TEST (DDT)
OS_DDT: 9:23 AM 9:26AM9.28 AM
OD_DDT: 9:23 AM 9:26AM9.28 AM

## 2023-06-09 ENCOUNTER — OFFICE (OUTPATIENT)
Facility: LOCATION | Age: 73
Setting detail: OPHTHALMOLOGY
End: 2023-06-09
Payer: MEDICARE

## 2023-06-09 ENCOUNTER — RX ONLY (RX ONLY)
Age: 73
End: 2023-06-09

## 2023-06-09 DIAGNOSIS — H40.1111: ICD-10-CM

## 2023-06-09 DIAGNOSIS — H40.1123: ICD-10-CM

## 2023-06-09 PROCEDURE — 99024 POSTOP FOLLOW-UP VISIT: CPT | Performed by: OPHTHALMOLOGY

## 2023-06-09 ASSESSMENT — PUNCTA - ASSESSMENT
OS_PUNCTA: SIL PLUG LLL LARGE
OD_PUNCTA: SIL PLUG RLL LARGE

## 2023-06-09 ASSESSMENT — TEAR BREAK UP TIME (TBUT)
OD_TBUT: 2+
OS_TBUT: 2+

## 2023-06-09 ASSESSMENT — REFRACTION_MANIFEST
OS_CYLINDER: -2.75
OD_CYLINDER: -1.75
OD_VA1: 20/30
OD_ADD: +2.75
OD_SPHERE: +0.75
OS_AXIS: 035
OS_VA1: 20/30
OS_ADD: +2.75
OD_AXIS: 120
OS_SPHERE: +0.75

## 2023-06-09 ASSESSMENT — AXIALLENGTH_DERIVED
OD_AL: 23.8735
OS_AL: 23.9435
OD_AL: 23.578
OS_AL: 23.7446

## 2023-06-09 ASSESSMENT — REFRACTION_CURRENTRX
OD_CYLINDER: -0.75
OD_SPHERE: +0.75
OD_CYLINDER: -1.75
OS_AXIS: 035
OS_AXIS: 045
OS_SPHERE: +1.00
OS_OVR_VA: 20/
OD_OVR_VA: 20/
OS_CYLINDER: -3.25
OS_SPHERE: +1.50
OD_SPHERE: +0.75
OD_AXIS: 110
OD_AXIS: 125
OS_OVR_VA: 20/
OD_OVR_VA: 20/
OS_CYLINDER: -1.25

## 2023-06-09 ASSESSMENT — KERATOMETRY
OD_K2POWER_DIOPTERS: 44.00
OS_K1POWER_DIOPTERS: 41.75
OS_AXISANGLE_DEGREES: 083
OD_AXISANGLE_DEGREES: 083
OS_K2POWER_DIOPTERS: 45.75
OD_K1POWER_DIOPTERS: 41.75

## 2023-06-09 ASSESSMENT — SUPERFICIAL PUNCTATE KERATITIS (SPK)
OS_SPK: 1+
OD_SPK: 1+

## 2023-06-09 ASSESSMENT — VISUAL ACUITY
OD_BCVA: 20/40
OS_BCVA: 20/25-1

## 2023-06-09 ASSESSMENT — TONOMETRY: OD_IOP_MMHG: 10

## 2023-06-09 ASSESSMENT — DYE DISAPPEARANCE TEST (DDT)
OD_DDT: 9:23 AM 9:26AM9.28 AM
OS_DDT: 9:23 AM 9:26AM9.28 AM

## 2023-06-09 ASSESSMENT — REFRACTION_AUTOREFRACTION
OS_SPHERE: +0.50
OS_AXIS: 022
OD_CYLINDER: -1.25
OS_CYLINDER: -3.25
OD_AXIS: 116
OD_SPHERE: +1.25

## 2023-06-09 ASSESSMENT — PACHYMETRY
OS_CT_CORRECTION: 2
OD_CT_UM: 513
OS_CT_UM: 515
OD_CT_CORRECTION: 2

## 2023-06-09 ASSESSMENT — SPHEQUIV_DERIVED
OD_SPHEQUIV: 0.625
OS_SPHEQUIV: -0.625
OD_SPHEQUIV: -0.125
OS_SPHEQUIV: -1.125

## 2023-06-16 ENCOUNTER — OFFICE (OUTPATIENT)
Facility: LOCATION | Age: 73
Setting detail: OPHTHALMOLOGY
End: 2023-06-16
Payer: MEDICARE

## 2023-06-16 DIAGNOSIS — H40.1123: ICD-10-CM

## 2023-06-16 DIAGNOSIS — H40.1111: ICD-10-CM

## 2023-06-16 PROCEDURE — 99024 POSTOP FOLLOW-UP VISIT: CPT | Performed by: OPHTHALMOLOGY

## 2023-06-16 ASSESSMENT — REFRACTION_CURRENTRX
OS_CYLINDER: -3.25
OD_CYLINDER: -1.75
OD_OVR_VA: 20/
OS_SPHERE: +1.00
OS_SPHERE: +1.50
OD_AXIS: 125
OD_AXIS: 110
OD_SPHERE: +0.75
OD_SPHERE: +0.75
OS_AXIS: 035
OS_OVR_VA: 20/
OS_AXIS: 045
OD_CYLINDER: -0.75
OS_OVR_VA: 20/
OS_CYLINDER: -1.25
OD_OVR_VA: 20/

## 2023-06-16 ASSESSMENT — AXIALLENGTH_DERIVED
OD_AL: 23.578
OS_AL: 23.7446
OD_AL: 23.8735
OS_AL: 23.9435

## 2023-06-16 ASSESSMENT — PUNCTA - ASSESSMENT
OS_PUNCTA: SIL PLUG LLL LARGE
OD_PUNCTA: SIL PLUG RLL LARGE

## 2023-06-16 ASSESSMENT — REFRACTION_MANIFEST
OD_ADD: +2.75
OS_ADD: +2.75
OD_AXIS: 120
OD_VA1: 20/30
OD_CYLINDER: -1.75
OS_SPHERE: +0.75
OS_AXIS: 035
OS_CYLINDER: -2.75
OD_SPHERE: +0.75
OS_VA1: 20/30

## 2023-06-16 ASSESSMENT — PACHYMETRY
OD_CT_UM: 513
OS_CT_UM: 515
OS_CT_CORRECTION: 2
OD_CT_CORRECTION: 2

## 2023-06-16 ASSESSMENT — TEAR BREAK UP TIME (TBUT)
OS_TBUT: 2+
OD_TBUT: 2+

## 2023-06-16 ASSESSMENT — VISUAL ACUITY
OS_BCVA: 20/25-1
OD_BCVA: 20/100

## 2023-06-16 ASSESSMENT — DYE DISAPPEARANCE TEST (DDT)
OD_DDT: 9:23 AM 9:26AM9.28 AM
OS_DDT: 9:23 AM 9:26AM9.28 AM

## 2023-06-16 ASSESSMENT — SPHEQUIV_DERIVED
OD_SPHEQUIV: 0.625
OS_SPHEQUIV: -1.125
OS_SPHEQUIV: -0.625
OD_SPHEQUIV: -0.125

## 2023-06-16 ASSESSMENT — KERATOMETRY
OS_K1POWER_DIOPTERS: 41.75
OD_K1POWER_DIOPTERS: 41.75
OS_K2POWER_DIOPTERS: 45.75
OS_AXISANGLE_DEGREES: 083
OD_AXISANGLE_DEGREES: 083
OD_K2POWER_DIOPTERS: 44.00

## 2023-06-16 ASSESSMENT — REFRACTION_AUTOREFRACTION
OD_CYLINDER: -1.25
OS_CYLINDER: -3.25
OS_AXIS: 022
OS_SPHERE: +0.50
OD_SPHERE: +1.25
OD_AXIS: 116

## 2023-06-16 ASSESSMENT — SUPERFICIAL PUNCTATE KERATITIS (SPK)
OD_SPK: 1+
OS_SPK: 1+

## 2023-06-16 ASSESSMENT — TONOMETRY
OS_IOP_MMHG: 16
OD_IOP_MMHG: 10

## 2023-06-20 ENCOUNTER — OFFICE (OUTPATIENT)
Facility: LOCATION | Age: 73
Setting detail: OPHTHALMOLOGY
End: 2023-06-20
Payer: MEDICARE

## 2023-06-20 ENCOUNTER — RX ONLY (RX ONLY)
Age: 73
End: 2023-06-20

## 2023-06-20 DIAGNOSIS — H40.1123: ICD-10-CM

## 2023-06-20 DIAGNOSIS — H40.1111: ICD-10-CM

## 2023-06-20 DIAGNOSIS — H35.81: ICD-10-CM

## 2023-06-20 PROCEDURE — 92134 CPTRZ OPH DX IMG PST SGM RTA: CPT | Performed by: OPHTHALMOLOGY

## 2023-06-20 PROCEDURE — 99024 POSTOP FOLLOW-UP VISIT: CPT | Performed by: OPHTHALMOLOGY

## 2023-06-20 ASSESSMENT — REFRACTION_MANIFEST
OS_ADD: +2.75
OD_CYLINDER: -1.75
OS_SPHERE: +0.75
OD_VA1: 20/30
OS_CYLINDER: -2.75
OD_SPHERE: +0.75
OS_AXIS: 035
OS_VA1: 20/30
OD_ADD: +2.75
OD_AXIS: 120

## 2023-06-20 ASSESSMENT — REFRACTION_AUTOREFRACTION
OD_SPHERE: +1.25
OD_AXIS: 115
OS_AXIS: 45
OD_CYLINDER: -2.00
OS_SPHERE: +1.75
OS_CYLINDER: -2.75

## 2023-06-20 ASSESSMENT — KERATOMETRY
OS_K1POWER_DIOPTERS: 41.50
OD_AXISANGLE_DEGREES: 82
OD_K1POWER_DIOPTERS: 42.00
OS_K2POWER_DIOPTERS: 44.25
OD_K2POWER_DIOPTERS: 44.50
OS_AXISANGLE_DEGREES: 91

## 2023-06-20 ASSESSMENT — REFRACTION_CURRENTRX
OD_CYLINDER: -1.75
OD_SPHERE: +0.75
OS_AXIS: 035
OS_AXIS: 045
OD_AXIS: 125
OD_SPHERE: +0.75
OS_CYLINDER: -1.25
OD_AXIS: 110
OS_OVR_VA: 20/
OD_OVR_VA: 20/
OS_CYLINDER: -3.25
OS_SPHERE: +1.50
OS_SPHERE: +1.00
OD_OVR_VA: 20/
OD_CYLINDER: -0.75
OS_OVR_VA: 20/

## 2023-06-20 ASSESSMENT — LID EXAM ASSESSMENTS
OD_BLEPHARITIS: RLL RUL 2+
OS_BLEPHARITIS: LLL LUL 2+

## 2023-06-20 ASSESSMENT — VISUAL ACUITY
OD_BCVA: 20/150+1
OS_BCVA: 20/25

## 2023-06-20 ASSESSMENT — AXIALLENGTH_DERIVED
OD_AL: 23.7333
OS_AL: 24.0747
OS_AL: 23.6757
OD_AL: 23.5863

## 2023-06-20 ASSESSMENT — CONFRONTATIONAL VISUAL FIELD TEST (CVF)
OS_FINDINGS: FULL
OD_FINDINGS: FULL

## 2023-06-20 ASSESSMENT — SPHEQUIV_DERIVED
OS_SPHEQUIV: -0.625
OS_SPHEQUIV: 0.375
OD_SPHEQUIV: -0.125
OD_SPHEQUIV: 0.25

## 2023-06-20 ASSESSMENT — TONOMETRY: OD_IOP_MMHG: 14

## 2023-06-20 ASSESSMENT — PACHYMETRY
OD_CT_CORRECTION: 2
OS_CT_CORRECTION: 2
OD_CT_UM: 513
OS_CT_UM: 515

## 2023-06-20 ASSESSMENT — SUPERFICIAL PUNCTATE KERATITIS (SPK)
OS_SPK: 1+
OD_SPK: 1+

## 2023-06-22 ENCOUNTER — RX ONLY (RX ONLY)
Age: 73
End: 2023-06-22

## 2023-06-22 ENCOUNTER — OFFICE (OUTPATIENT)
Dept: URBAN - METROPOLITAN AREA CLINIC 77 | Facility: CLINIC | Age: 73
Setting detail: OPHTHALMOLOGY
End: 2023-06-22
Payer: MEDICARE

## 2023-06-22 DIAGNOSIS — H01.004: ICD-10-CM

## 2023-06-22 DIAGNOSIS — H01.001: ICD-10-CM

## 2023-06-22 DIAGNOSIS — H01.005: ICD-10-CM

## 2023-06-22 DIAGNOSIS — H35.352: ICD-10-CM

## 2023-06-22 DIAGNOSIS — H26.492: ICD-10-CM

## 2023-06-22 DIAGNOSIS — H11.823: ICD-10-CM

## 2023-06-22 DIAGNOSIS — H16.223: ICD-10-CM

## 2023-06-22 DIAGNOSIS — H40.1111: ICD-10-CM

## 2023-06-22 DIAGNOSIS — H01.002: ICD-10-CM

## 2023-06-22 DIAGNOSIS — H40.1123: ICD-10-CM

## 2023-06-22 PROCEDURE — 92235 FLUORESCEIN ANGRPH MLTIFRAME: CPT | Performed by: OPHTHALMOLOGY

## 2023-06-22 PROCEDURE — 92250 FUNDUS PHOTOGRAPHY W/I&R: CPT | Performed by: OPHTHALMOLOGY

## 2023-06-22 PROCEDURE — 99214 OFFICE O/P EST MOD 30 MIN: CPT | Performed by: OPHTHALMOLOGY

## 2023-06-22 ASSESSMENT — KERATOMETRY
OS_K1POWER_DIOPTERS: 41.50
OS_AXISANGLE_DEGREES: 91
OD_K2POWER_DIOPTERS: 44.50
OD_AXISANGLE_DEGREES: 82
OS_K2POWER_DIOPTERS: 44.25
OD_K1POWER_DIOPTERS: 42.00

## 2023-06-22 ASSESSMENT — SUPERFICIAL PUNCTATE KERATITIS (SPK)
OD_SPK: 1+
OS_SPK: 1+

## 2023-06-22 ASSESSMENT — REFRACTION_AUTOREFRACTION
OS_SPHERE: +1.75
OS_CYLINDER: -2.75
OD_SPHERE: +1.25
OS_AXIS: 45
OD_AXIS: 115
OD_CYLINDER: -2.00

## 2023-06-22 ASSESSMENT — REFRACTION_MANIFEST
OD_AXIS: 120
OD_VA1: 20/30
OS_SPHERE: +0.75
OD_CYLINDER: -1.75
OD_SPHERE: +0.75
OS_VA1: 20/30
OS_CYLINDER: -2.75
OS_AXIS: 035
OS_ADD: +2.75
OD_ADD: +2.75

## 2023-06-22 ASSESSMENT — AXIALLENGTH_DERIVED
OD_AL: 23.5863
OS_AL: 23.6757
OD_AL: 23.7333
OS_AL: 24.0747

## 2023-06-22 ASSESSMENT — REFRACTION_CURRENTRX
OS_OVR_VA: 20/
OD_CYLINDER: -1.75
OS_OVR_VA: 20/
OS_SPHERE: +1.00
OS_AXIS: 035
OS_SPHERE: +1.50
OD_OVR_VA: 20/
OS_AXIS: 045
OS_CYLINDER: -1.25
OD_OVR_VA: 20/
OD_CYLINDER: -0.75
OD_AXIS: 125
OS_CYLINDER: -3.25
OD_SPHERE: +0.75
OD_SPHERE: +0.75
OD_AXIS: 110

## 2023-06-22 ASSESSMENT — SPHEQUIV_DERIVED
OD_SPHEQUIV: -0.125
OS_SPHEQUIV: 0.375
OS_SPHEQUIV: -0.625
OD_SPHEQUIV: 0.25

## 2023-06-22 ASSESSMENT — LID EXAM ASSESSMENTS
OD_BLEPHARITIS: RLL RUL 2+
OS_BLEPHARITIS: LLL LUL 2+

## 2023-06-22 ASSESSMENT — VISUAL ACUITY
OS_BCVA: 20/25
OD_BCVA: 20/100-

## 2023-06-22 ASSESSMENT — CONFRONTATIONAL VISUAL FIELD TEST (CVF)
OS_FINDINGS: FULL
OD_FINDINGS: FULL

## 2023-06-29 ENCOUNTER — OFFICE (OUTPATIENT)
Facility: LOCATION | Age: 73
Setting detail: OPHTHALMOLOGY
End: 2023-06-29
Payer: MEDICARE

## 2023-06-29 DIAGNOSIS — H40.1111: ICD-10-CM

## 2023-06-29 DIAGNOSIS — H35.352: ICD-10-CM

## 2023-06-29 DIAGNOSIS — H40.1123: ICD-10-CM

## 2023-06-29 PROBLEM — H01.002 BLEPHARITIS; RIGHT UPPER LID, RIGHT LOWER LID, LEFT UPPER LID, LEFT LOWER LID: Status: ACTIVE | Noted: 2023-06-20

## 2023-06-29 PROBLEM — H01.004 BLEPHARITIS; RIGHT UPPER LID, RIGHT LOWER LID, LEFT UPPER LID, LEFT LOWER LID: Status: ACTIVE | Noted: 2023-06-20

## 2023-06-29 PROBLEM — H01.005 BLEPHARITIS; RIGHT UPPER LID, RIGHT LOWER LID, LEFT UPPER LID, LEFT LOWER LID: Status: ACTIVE | Noted: 2023-06-20

## 2023-06-29 PROBLEM — H01.001 BLEPHARITIS; RIGHT UPPER LID, RIGHT LOWER LID, LEFT UPPER LID, LEFT LOWER LID: Status: ACTIVE | Noted: 2023-06-20

## 2023-06-29 PROCEDURE — 99024 POSTOP FOLLOW-UP VISIT: CPT | Performed by: OPHTHALMOLOGY

## 2023-06-29 ASSESSMENT — REFRACTION_MANIFEST
OS_VA1: 20/30
OD_ADD: +2.75
OS_ADD: +2.75
OS_CYLINDER: -2.75
OS_AXIS: 035
OD_CYLINDER: -1.75
OD_VA1: 20/30
OS_SPHERE: +0.75
OD_SPHERE: +0.75
OD_AXIS: 120

## 2023-06-29 ASSESSMENT — KERATOMETRY
OS_AXISANGLE_DEGREES: 089
OS_K2POWER_DIOPTERS: 44.00
OD_K1POWER_DIOPTERS: 41.75
OS_K1POWER_DIOPTERS: 41.50
OD_AXISANGLE_DEGREES: 085
OD_K2POWER_DIOPTERS: 44.00

## 2023-06-29 ASSESSMENT — LID EXAM ASSESSMENTS
OD_BLEPHARITIS: RLL RUL 2+
OS_BLEPHARITIS: LLL LUL 2+

## 2023-06-29 ASSESSMENT — REFRACTION_CURRENTRX
OD_SPHERE: +0.75
OS_OVR_VA: 20/
OS_OVR_VA: 20/
OD_SPHERE: +0.75
OS_AXIS: 045
OD_OVR_VA: 20/
OD_AXIS: 110
OD_CYLINDER: -0.75
OS_SPHERE: +1.00
OS_CYLINDER: -3.25
OS_AXIS: 035
OD_AXIS: 125
OD_CYLINDER: -1.75
OS_SPHERE: +1.50
OD_OVR_VA: 20/
OS_CYLINDER: -1.25

## 2023-06-29 ASSESSMENT — REFRACTION_AUTOREFRACTION
OD_AXIS: 114
OS_CYLINDER: -3.00
OS_AXIS: 043
OD_CYLINDER: -1.25
OD_SPHERE: +1.00
OS_SPHERE: +1.25

## 2023-06-29 ASSESSMENT — SUPERFICIAL PUNCTATE KERATITIS (SPK)
OD_SPK: 1+
OS_SPK: 1+

## 2023-06-29 ASSESSMENT — AXIALLENGTH_DERIVED
OD_AL: 23.6757
OS_AL: 23.9708
OD_AL: 23.8735
OS_AL: 24.1226

## 2023-06-29 ASSESSMENT — TONOMETRY
OS_IOP_MMHG: 16
OD_IOP_MMHG: 14

## 2023-06-29 ASSESSMENT — PACHYMETRY
OD_CT_CORRECTION: 2
OD_CT_UM: 513
OS_CT_UM: 515
OS_CT_CORRECTION: 2

## 2023-06-29 ASSESSMENT — VISUAL ACUITY
OD_BCVA: 20/150
OS_BCVA: 20/25

## 2023-06-29 ASSESSMENT — SPHEQUIV_DERIVED
OD_SPHEQUIV: 0.375
OS_SPHEQUIV: -0.25
OD_SPHEQUIV: -0.125
OS_SPHEQUIV: -0.625

## 2023-07-21 ENCOUNTER — OFFICE (OUTPATIENT)
Facility: LOCATION | Age: 73
Setting detail: OPHTHALMOLOGY
End: 2023-07-21
Payer: MEDICARE

## 2023-07-21 DIAGNOSIS — H40.1123: ICD-10-CM

## 2023-07-21 DIAGNOSIS — H35.352: ICD-10-CM

## 2023-07-21 DIAGNOSIS — H52.03: ICD-10-CM

## 2023-07-21 DIAGNOSIS — H40.1111: ICD-10-CM

## 2023-07-21 PROCEDURE — 92134 CPTRZ OPH DX IMG PST SGM RTA: CPT | Performed by: OPHTHALMOLOGY

## 2023-07-21 PROCEDURE — 92012 INTRM OPH EXAM EST PATIENT: CPT | Performed by: OPHTHALMOLOGY

## 2023-07-21 PROCEDURE — 92015 DETERMINE REFRACTIVE STATE: CPT | Performed by: OPHTHALMOLOGY

## 2023-07-21 ASSESSMENT — REFRACTION_CURRENTRX
OD_OVR_VA: 20/
OS_AXIS: 035
OS_OVR_VA: 20/
OD_CYLINDER: -1.75
OD_AXIS: 110
OD_SPHERE: +0.75
OS_CYLINDER: -3.25
OD_SPHERE: +0.75
OS_OVR_VA: 20/
OS_SPHERE: +1.50
OS_CYLINDER: -1.25
OD_AXIS: 125
OS_SPHERE: +1.00
OD_OVR_VA: 20/
OS_AXIS: 045
OD_CYLINDER: -0.75

## 2023-07-21 ASSESSMENT — PACHYMETRY
OS_CT_CORRECTION: 2
OD_CT_UM: 513
OS_CT_UM: 515
OD_CT_CORRECTION: 2

## 2023-07-21 ASSESSMENT — REFRACTION_MANIFEST
OD_CYLINDER: -1.75
OD_AXIS: 120
OD_ADD: +2.75
OD_VA1: 20/30
OD_SPHERE: +4.00
OS_SPHERE: +0.75
OS_VA1: 20/30
OD_SPHERE: +0.75
OS_AXIS: 035
OS_SPHERE: +4.50
OS_CYLINDER: -3.25
OD_CYLINDER: -1.25
OS_AXIS: 045
OS_CYLINDER: -2.75
OS_ADD: +2.75
OD_AXIS: 115

## 2023-07-21 ASSESSMENT — KERATOMETRY
OS_AXISANGLE_DEGREES: 092
OS_K2POWER_DIOPTERS: 44.50
OS_K1POWER_DIOPTERS: 41.75
OD_K2POWER_DIOPTERS: 44.00
OD_K1POWER_DIOPTERS: 41.75
OD_AXISANGLE_DEGREES: 086

## 2023-07-21 ASSESSMENT — VISUAL ACUITY
OD_BCVA: 20/40-2
OS_BCVA: 20/20

## 2023-07-21 ASSESSMENT — SPHEQUIV_DERIVED
OD_SPHEQUIV: -0.125
OS_SPHEQUIV: 2.875
OS_SPHEQUIV: -0.125
OD_SPHEQUIV: 3.375
OD_SPHEQUIV: 0.375
OS_SPHEQUIV: -0.625

## 2023-07-21 ASSESSMENT — LID EXAM ASSESSMENTS
OS_BLEPHARITIS: LLL LUL 2+
OD_BLEPHARITIS: RLL RUL 2+

## 2023-07-21 ASSESSMENT — AXIALLENGTH_DERIVED
OS_AL: 22.6488
OS_AL: 23.9794
OD_AL: 22.5543
OS_AL: 23.7798
OD_AL: 23.8735
OD_AL: 23.6757

## 2023-07-21 ASSESSMENT — SUPERFICIAL PUNCTATE KERATITIS (SPK)
OS_SPK: 1+
OD_SPK: 1+

## 2023-07-21 ASSESSMENT — REFRACTION_AUTOREFRACTION
OS_CYLINDER: -3.25
OD_CYLINDER: -1.25
OS_SPHERE: +1.50
OD_SPHERE: +1.00
OS_AXIS: 044
OD_AXIS: 116

## 2023-07-21 ASSESSMENT — TONOMETRY: OS_IOP_MMHG: 15

## 2023-07-21 ASSESSMENT — CONFRONTATIONAL VISUAL FIELD TEST (CVF)
OD_FINDINGS: FULL
OS_FINDINGS: FULL

## 2023-07-26 ENCOUNTER — OFFICE (OUTPATIENT)
Facility: LOCATION | Age: 73
Setting detail: OPHTHALMOLOGY
End: 2023-07-26
Payer: MEDICARE

## 2023-07-26 DIAGNOSIS — H40.1123: ICD-10-CM

## 2023-07-26 DIAGNOSIS — H52.4: ICD-10-CM

## 2023-07-26 DIAGNOSIS — H35.352: ICD-10-CM

## 2023-07-26 DIAGNOSIS — H40.1111: ICD-10-CM

## 2023-07-26 PROCEDURE — 99024 POSTOP FOLLOW-UP VISIT: CPT | Performed by: OPHTHALMOLOGY

## 2023-07-26 ASSESSMENT — REFRACTION_CURRENTRX
OD_SPHERE: +0.75
OS_SPHERE: +1.50
OD_CYLINDER: -1.75
OS_OVR_VA: 20/
OS_AXIS: 045
OS_AXIS: 035
OD_AXIS: 110
OS_SPHERE: +1.00
OS_CYLINDER: -1.25
OS_OVR_VA: 20/
OD_OVR_VA: 20/
OS_CYLINDER: -3.25
OD_OVR_VA: 20/
OD_SPHERE: +0.75
OD_AXIS: 125
OD_CYLINDER: -0.75

## 2023-07-26 ASSESSMENT — VISUAL ACUITY
OD_BCVA: 20/60-2
OS_BCVA: 20/20

## 2023-07-26 ASSESSMENT — SPHEQUIV_DERIVED
OS_SPHEQUIV: -0.625
OS_SPHEQUIV: -0.125
OS_SPHEQUIV: 2.875
OD_SPHEQUIV: 0.125
OD_SPHEQUIV: 3.375
OD_SPHEQUIV: -0.125

## 2023-07-26 ASSESSMENT — REFRACTION_MANIFEST
OD_CYLINDER: -1.75
OS_ADD: +2.75
OD_ADD: +2.75
OD_SPHERE: +4.00
OD_AXIS: 115
OD_CYLINDER: -1.25
OD_VA1: 20/30
OS_SPHERE: +4.50
OS_VA1: 20/30
OS_CYLINDER: -2.75
OS_SPHERE: +0.75
OD_AXIS: 120
OD_SPHERE: +0.75
OS_AXIS: 045
OS_CYLINDER: -3.25
OS_AXIS: 035

## 2023-07-26 ASSESSMENT — LID EXAM ASSESSMENTS
OS_BLEPHARITIS: LLL LUL 2+
OD_BLEPHARITIS: RLL RUL 2+

## 2023-07-26 ASSESSMENT — AXIALLENGTH_DERIVED
OD_AL: 23.9206
OD_AL: 23.8209
OS_AL: 22.8193
OD_AL: 22.5963
OS_AL: 23.9679
OS_AL: 24.1707

## 2023-07-26 ASSESSMENT — REFRACTION_AUTOREFRACTION
OS_CYLINDER: -2.75
OS_AXIS: 037
OD_SPHERE: +0.50
OD_CYLINDER: -0.75
OD_AXIS: 129
OS_SPHERE: +1.25

## 2023-07-26 ASSESSMENT — SUPERFICIAL PUNCTATE KERATITIS (SPK)
OS_SPK: 1+
OD_SPK: 1+

## 2023-07-26 ASSESSMENT — KERATOMETRY
OD_K1POWER_DIOPTERS: 41.50
OD_K2POWER_DIOPTERS: 44.00
OD_AXISANGLE_DEGREES: 085
OS_AXISANGLE_DEGREES: 084
OS_K2POWER_DIOPTERS: 44.00
OS_K1POWER_DIOPTERS: 41.25

## 2023-08-31 ENCOUNTER — OFFICE (OUTPATIENT)
Facility: LOCATION | Age: 73
Setting detail: OPHTHALMOLOGY
End: 2023-08-31
Payer: MEDICARE

## 2023-08-31 DIAGNOSIS — H40.1111: ICD-10-CM

## 2023-08-31 DIAGNOSIS — H40.1123: ICD-10-CM

## 2023-08-31 DIAGNOSIS — H35.352: ICD-10-CM

## 2023-08-31 PROCEDURE — 92134 CPTRZ OPH DX IMG PST SGM RTA: CPT | Performed by: OPHTHALMOLOGY

## 2023-08-31 PROCEDURE — 92012 INTRM OPH EXAM EST PATIENT: CPT | Performed by: OPHTHALMOLOGY

## 2023-08-31 ASSESSMENT — REFRACTION_AUTOREFRACTION
OD_SPHERE: +0.50
OD_CYLINDER: -0.75
OD_AXIS: 129
OS_AXIS: 037
OS_CYLINDER: -2.75
OS_SPHERE: +1.25

## 2023-08-31 ASSESSMENT — REFRACTION_MANIFEST
OS_ADD: +2.75
OD_AXIS: 120
OS_CYLINDER: -3.25
OD_SPHERE: +4.00
OS_VA1: 20/30
OS_AXIS: 035
OD_ADD: +2.75
OS_SPHERE: +0.75
OD_AXIS: 115
OS_SPHERE: +4.50
OS_AXIS: 045
OD_CYLINDER: -1.25
OD_CYLINDER: -1.75
OD_SPHERE: +0.75
OD_VA1: 20/30
OS_CYLINDER: -2.75

## 2023-08-31 ASSESSMENT — PACHYMETRY
OD_CT_UM: 513
OD_CT_CORRECTION: 2
OS_CT_CORRECTION: 2
OS_CT_UM: 515

## 2023-08-31 ASSESSMENT — TONOMETRY
OS_IOP_MMHG: 13
OD_IOP_MMHG: 17

## 2023-08-31 ASSESSMENT — LID EXAM ASSESSMENTS
OS_BLEPHARITIS: LLL LUL 2+
OD_BLEPHARITIS: RLL RUL 2+

## 2023-08-31 ASSESSMENT — REFRACTION_CURRENTRX
OD_OVR_VA: 20/
OS_OVR_VA: 20/
OD_SPHERE: +0.75
OS_CYLINDER: -1.25
OS_CYLINDER: -3.25
OD_SPHERE: +0.75
OS_AXIS: 035
OD_AXIS: 125
OS_AXIS: 045
OS_SPHERE: +1.00
OS_OVR_VA: 20/
OD_AXIS: 110
OD_OVR_VA: 20/
OD_CYLINDER: -0.75
OD_CYLINDER: -1.75
OS_SPHERE: +1.50

## 2023-08-31 ASSESSMENT — SPHEQUIV_DERIVED
OD_SPHEQUIV: 3.375
OD_SPHEQUIV: 0.125
OS_SPHEQUIV: -0.125
OS_SPHEQUIV: 2.875
OS_SPHEQUIV: -0.625
OD_SPHEQUIV: -0.125

## 2023-08-31 ASSESSMENT — VISUAL ACUITY
OS_BCVA: 20/20-
OD_BCVA: 20/150-

## 2023-08-31 ASSESSMENT — SUPERFICIAL PUNCTATE KERATITIS (SPK)
OS_SPK: 1+
OD_SPK: 1+

## 2023-08-31 ASSESSMENT — CONFRONTATIONAL VISUAL FIELD TEST (CVF)
OS_FINDINGS: FULL
OD_FINDINGS: FULL

## 2023-09-15 ENCOUNTER — RX ONLY (RX ONLY)
Age: 73
End: 2023-09-15

## 2023-09-15 ENCOUNTER — OFFICE (OUTPATIENT)
Dept: URBAN - METROPOLITAN AREA CLINIC 77 | Facility: CLINIC | Age: 73
Setting detail: OPHTHALMOLOGY
End: 2023-09-15
Payer: MEDICARE

## 2023-09-15 DIAGNOSIS — H40.1123: ICD-10-CM

## 2023-09-15 DIAGNOSIS — H01.005: ICD-10-CM

## 2023-09-15 DIAGNOSIS — H01.002: ICD-10-CM

## 2023-09-15 DIAGNOSIS — H26.492: ICD-10-CM

## 2023-09-15 DIAGNOSIS — H35.352: ICD-10-CM

## 2023-09-15 DIAGNOSIS — H11.823: ICD-10-CM

## 2023-09-15 DIAGNOSIS — H01.001: ICD-10-CM

## 2023-09-15 DIAGNOSIS — T15.12XD: ICD-10-CM

## 2023-09-15 DIAGNOSIS — E11.9: ICD-10-CM

## 2023-09-15 DIAGNOSIS — H40.1111: ICD-10-CM

## 2023-09-15 DIAGNOSIS — H16.223: ICD-10-CM

## 2023-09-15 DIAGNOSIS — H01.004: ICD-10-CM

## 2023-09-15 PROCEDURE — 92250 FUNDUS PHOTOGRAPHY W/I&R: CPT | Performed by: OPHTHALMOLOGY

## 2023-09-15 PROCEDURE — 99214 OFFICE O/P EST MOD 30 MIN: CPT | Performed by: OPHTHALMOLOGY

## 2023-09-15 ASSESSMENT — SPHEQUIV_DERIVED
OD_SPHEQUIV: -0.125
OS_SPHEQUIV: 2.875
OD_SPHEQUIV: 3.375
OS_SPHEQUIV: -0.625
OS_SPHEQUIV: -0.125
OD_SPHEQUIV: 0.125

## 2023-09-15 ASSESSMENT — REFRACTION_AUTOREFRACTION
OD_SPHERE: +0.50
OS_AXIS: 037
OD_CYLINDER: -0.75
OS_SPHERE: +1.25
OD_AXIS: 129
OS_CYLINDER: -2.75

## 2023-09-15 ASSESSMENT — REFRACTION_CURRENTRX
OS_AXIS: 035
OD_AXIS: 125
OD_CYLINDER: -1.75
OS_SPHERE: +1.00
OD_AXIS: 110
OS_AXIS: 045
OD_SPHERE: +0.75
OD_SPHERE: +0.75
OS_OVR_VA: 20/
OD_OVR_VA: 20/
OS_CYLINDER: -3.25
OS_CYLINDER: -1.25
OS_SPHERE: +1.50
OD_OVR_VA: 20/
OD_CYLINDER: -0.75
OS_OVR_VA: 20/

## 2023-09-15 ASSESSMENT — REFRACTION_MANIFEST
OS_VA1: 20/30
OD_CYLINDER: -1.75
OD_SPHERE: +4.00
OS_ADD: +2.75
OS_SPHERE: +4.50
OD_AXIS: 115
OD_SPHERE: +0.75
OS_CYLINDER: -2.75
OS_AXIS: 035
OS_CYLINDER: -3.25
OS_AXIS: 045
OS_SPHERE: +0.75
OD_CYLINDER: -1.25
OD_VA1: 20/30
OD_AXIS: 120
OD_ADD: +2.75

## 2023-09-15 ASSESSMENT — AXIALLENGTH_DERIVED
OD_AL: 23.8209
OD_AL: 22.5963
OS_AL: 22.8193
OD_AL: 23.9206
OS_AL: 23.9679
OS_AL: 24.1707

## 2023-09-15 ASSESSMENT — CONFRONTATIONAL VISUAL FIELD TEST (CVF)
OD_FINDINGS: FULL
OS_FINDINGS: FULL

## 2023-09-15 ASSESSMENT — KERATOMETRY
OD_K2POWER_DIOPTERS: 44.00
OS_AXISANGLE_DEGREES: 084
OS_K1POWER_DIOPTERS: 41.25
OD_K1POWER_DIOPTERS: 41.50
OD_AXISANGLE_DEGREES: 085
OS_K2POWER_DIOPTERS: 44.00

## 2023-09-15 ASSESSMENT — LID EXAM ASSESSMENTS
OS_BLEPHARITIS: LLL LUL 2+
OD_BLEPHARITIS: RLL RUL 2+

## 2023-09-15 ASSESSMENT — SUPERFICIAL PUNCTATE KERATITIS (SPK)
OD_SPK: 1+
OS_SPK: 1+

## 2023-09-15 ASSESSMENT — VISUAL ACUITY
OS_BCVA: 20/20-2
OD_BCVA: 20/70-2

## 2023-11-01 ENCOUNTER — OFFICE (OUTPATIENT)
Dept: URBAN - METROPOLITAN AREA CLINIC 77 | Facility: CLINIC | Age: 73
Setting detail: OPHTHALMOLOGY
End: 2023-11-01
Payer: MEDICARE

## 2023-11-01 DIAGNOSIS — H40.1123: ICD-10-CM

## 2023-11-01 DIAGNOSIS — H53.40: ICD-10-CM

## 2023-11-01 DIAGNOSIS — H01.004: ICD-10-CM

## 2023-11-01 DIAGNOSIS — H01.002: ICD-10-CM

## 2023-11-01 DIAGNOSIS — H40.1111: ICD-10-CM

## 2023-11-01 DIAGNOSIS — H16.223: ICD-10-CM

## 2023-11-01 DIAGNOSIS — H11.823: ICD-10-CM

## 2023-11-01 DIAGNOSIS — T15.12XD: ICD-10-CM

## 2023-11-01 DIAGNOSIS — H47.212: ICD-10-CM

## 2023-11-01 DIAGNOSIS — H35.352: ICD-10-CM

## 2023-11-01 DIAGNOSIS — H01.001: ICD-10-CM

## 2023-11-01 DIAGNOSIS — H26.492: ICD-10-CM

## 2023-11-01 PROCEDURE — 92250 FUNDUS PHOTOGRAPHY W/I&R: CPT | Performed by: OPHTHALMOLOGY

## 2023-11-01 PROCEDURE — 99214 OFFICE O/P EST MOD 30 MIN: CPT | Performed by: OPHTHALMOLOGY

## 2023-11-01 PROCEDURE — 92083 EXTENDED VISUAL FIELD XM: CPT | Performed by: OPHTHALMOLOGY

## 2023-11-01 ASSESSMENT — REFRACTION_CURRENTRX
OS_OVR_VA: 20/
OD_CYLINDER: -0.75
OD_AXIS: 125
OD_SPHERE: +0.75
OS_CYLINDER: -3.25
OD_SPHERE: +0.75
OS_CYLINDER: -1.25
OD_AXIS: 110
OD_CYLINDER: -1.75
OS_AXIS: 035
OD_OVR_VA: 20/
OS_OVR_VA: 20/
OS_SPHERE: +1.50
OS_AXIS: 045
OS_SPHERE: +1.00
OD_OVR_VA: 20/

## 2023-11-01 ASSESSMENT — REFRACTION_AUTOREFRACTION
OS_SPHERE: +1.25
OS_CYLINDER: -2.75
OD_SPHERE: +0.50
OS_AXIS: 037
OD_AXIS: 129
OD_CYLINDER: -0.75

## 2023-11-01 ASSESSMENT — REFRACTION_MANIFEST
OD_CYLINDER: -1.25
OD_VA1: 20/30
OD_SPHERE: +0.75
OS_AXIS: 045
OD_ADD: +2.75
OD_SPHERE: +4.00
OS_CYLINDER: -2.75
OS_CYLINDER: -3.25
OS_AXIS: 035
OD_AXIS: 120
OS_ADD: +2.75
OD_CYLINDER: -1.75
OS_SPHERE: +4.50
OS_SPHERE: +0.75
OS_VA1: 20/30
OD_AXIS: 115

## 2023-11-01 ASSESSMENT — CONFRONTATIONAL VISUAL FIELD TEST (CVF)
OD_FINDINGS: FULL
OS_FINDINGS: FULL

## 2023-11-01 ASSESSMENT — SPHEQUIV_DERIVED
OD_SPHEQUIV: -0.125
OS_SPHEQUIV: -0.625
OD_SPHEQUIV: 3.375
OD_SPHEQUIV: 0.125
OS_SPHEQUIV: -0.125
OS_SPHEQUIV: 2.875

## 2023-11-01 ASSESSMENT — LID EXAM ASSESSMENTS
OS_BLEPHARITIS: LLL LUL 2+
OD_BLEPHARITIS: RLL RUL 2+

## 2023-11-01 ASSESSMENT — SUPERFICIAL PUNCTATE KERATITIS (SPK)
OD_SPK: 1+
OS_SPK: 1+

## 2023-11-21 ENCOUNTER — OFFICE (OUTPATIENT)
Facility: LOCATION | Age: 73
Setting detail: OPHTHALMOLOGY
End: 2023-11-21
Payer: MEDICARE

## 2023-11-21 DIAGNOSIS — H40.1123: ICD-10-CM

## 2023-11-21 DIAGNOSIS — H40.1111: ICD-10-CM

## 2023-11-21 PROBLEM — H53.40 VISUAL FIELD DEFECT,UNSPECIFIED ; BOTH EYES: Status: ACTIVE | Noted: 2023-11-01

## 2023-11-21 PROBLEM — H47.212 OPTIC ATROPHY PRIMARY; LEFT EYE: Status: ACTIVE | Noted: 2023-11-01

## 2023-11-21 PROCEDURE — 92083 EXTENDED VISUAL FIELD XM: CPT | Performed by: OPHTHALMOLOGY

## 2023-11-21 PROCEDURE — 92012 INTRM OPH EXAM EST PATIENT: CPT | Performed by: OPHTHALMOLOGY

## 2023-11-21 ASSESSMENT — REFRACTION_CURRENTRX
OD_SPHERE: +0.75
OS_SPHERE: +1.00
OD_AXIS: 125
OD_OVR_VA: 20/
OS_OVR_VA: 20/
OS_AXIS: 045
OS_SPHERE: +1.50
OD_OVR_VA: 20/
OS_CYLINDER: -1.25
OD_CYLINDER: -1.75
OS_OVR_VA: 20/
OD_AXIS: 110
OD_CYLINDER: -0.75
OS_CYLINDER: -3.25
OD_SPHERE: +0.75
OS_AXIS: 035

## 2023-11-21 ASSESSMENT — CONFRONTATIONAL VISUAL FIELD TEST (CVF)
OS_FINDINGS: FULL
OD_FINDINGS: FULL

## 2023-11-21 ASSESSMENT — REFRACTION_MANIFEST
OS_SPHERE: +0.75
OD_CYLINDER: -1.25
OS_ADD: +2.75
OD_AXIS: 120
OD_SPHERE: +4.00
OD_SPHERE: +0.75
OD_AXIS: 115
OS_CYLINDER: -3.25
OD_VA1: 20/30
OS_AXIS: 045
OS_AXIS: 035
OD_CYLINDER: -1.75
OD_ADD: +2.75
OS_CYLINDER: -2.75
OS_SPHERE: +4.50
OS_VA1: 20/30

## 2023-11-21 ASSESSMENT — SUPERFICIAL PUNCTATE KERATITIS (SPK)
OS_SPK: 1+
OD_SPK: 1+

## 2023-11-21 ASSESSMENT — SPHEQUIV_DERIVED
OS_SPHEQUIV: 0
OS_SPHEQUIV: -0.625
OD_SPHEQUIV: -0.125
OD_SPHEQUIV: 3.375
OS_SPHEQUIV: 2.875

## 2023-11-21 ASSESSMENT — REFRACTION_AUTOREFRACTION
OS_AXIS: 041
OS_CYLINDER: -2.50
OD_SPHERE: ++1.25
OD_CYLINDER: -1.50
OD_AXIS: 117
OS_SPHERE: +1.25

## 2023-11-21 ASSESSMENT — LID EXAM ASSESSMENTS
OD_BLEPHARITIS: RLL RUL 2+
OS_BLEPHARITIS: LLL LUL 2+

## 2024-01-10 ENCOUNTER — OFFICE (OUTPATIENT)
Facility: LOCATION | Age: 74
Setting detail: OPHTHALMOLOGY
End: 2024-01-10
Payer: MEDICARE

## 2024-01-10 DIAGNOSIS — H40.1111: ICD-10-CM

## 2024-01-10 DIAGNOSIS — H40.1123: ICD-10-CM

## 2024-01-10 PROCEDURE — 92014 COMPRE OPH EXAM EST PT 1/>: CPT | Performed by: OPHTHALMOLOGY

## 2024-01-10 PROCEDURE — 92133 CPTRZD OPH DX IMG PST SGM ON: CPT | Performed by: OPHTHALMOLOGY

## 2024-01-10 PROCEDURE — 92083 EXTENDED VISUAL FIELD XM: CPT | Performed by: OPHTHALMOLOGY

## 2024-01-10 ASSESSMENT — REFRACTION_CURRENTRX
OD_AXIS: 125
OD_SPHERE: +0.75
OS_AXIS: 035
OD_AXIS: 110
OD_SPHERE: +0.75
OS_OVR_VA: 20/
OS_SPHERE: +1.50
OD_OVR_VA: 20/
OS_OVR_VA: 20/
OD_CYLINDER: -1.75
OS_CYLINDER: -3.25
OD_OVR_VA: 20/
OS_AXIS: 045
OS_SPHERE: +1.00
OD_CYLINDER: -0.75
OS_CYLINDER: -1.25

## 2024-01-10 ASSESSMENT — REFRACTION_AUTOREFRACTION
OD_SPHERE: +0.75
OD_CYLINDER: -1.25
OS_SPHERE: +1.00
OD_AXIS: 111
OS_CYLINDER: -2.75
OS_AXIS: 034

## 2024-01-10 ASSESSMENT — REFRACTION_MANIFEST
OS_CYLINDER: -3.25
OS_AXIS: 045
OS_SPHERE: +0.75
OS_CYLINDER: -2.75
OD_AXIS: 120
OS_AXIS: 035
OS_VA1: 20/30
OD_ADD: +2.75
OD_SPHERE: +0.75
OD_CYLINDER: -1.25
OD_SPHERE: +4.00
OD_CYLINDER: -1.75
OD_AXIS: 115
OD_VA1: 20/30
OS_SPHERE: +4.50
OS_ADD: +2.75

## 2024-01-10 ASSESSMENT — SUPERFICIAL PUNCTATE KERATITIS (SPK)
OS_SPK: 1+
OD_SPK: 1+

## 2024-01-10 ASSESSMENT — SPHEQUIV_DERIVED
OD_SPHEQUIV: -0.125
OS_SPHEQUIV: 2.875
OS_SPHEQUIV: -0.625
OD_SPHEQUIV: 3.375
OS_SPHEQUIV: -0.375
OD_SPHEQUIV: 0.125

## 2024-01-10 ASSESSMENT — LID EXAM ASSESSMENTS
OD_BLEPHARITIS: RLL RUL 2+
OS_BLEPHARITIS: LLL LUL 2+

## 2024-01-10 ASSESSMENT — CONFRONTATIONAL VISUAL FIELD TEST (CVF)
OS_FINDINGS: FULL
OD_FINDINGS: FULL

## 2024-02-07 ENCOUNTER — OFFICE (OUTPATIENT)
Dept: URBAN - METROPOLITAN AREA CLINIC 77 | Facility: CLINIC | Age: 74
Setting detail: OPHTHALMOLOGY
End: 2024-02-07
Payer: MEDICARE

## 2024-02-07 DIAGNOSIS — H01.004: ICD-10-CM

## 2024-02-07 DIAGNOSIS — H01.001: ICD-10-CM

## 2024-02-07 DIAGNOSIS — H11.823: ICD-10-CM

## 2024-02-07 DIAGNOSIS — H53.40: ICD-10-CM

## 2024-02-07 DIAGNOSIS — T15.12XD: ICD-10-CM

## 2024-02-07 DIAGNOSIS — H40.1111: ICD-10-CM

## 2024-02-07 DIAGNOSIS — H35.352: ICD-10-CM

## 2024-02-07 DIAGNOSIS — H47.212: ICD-10-CM

## 2024-02-07 DIAGNOSIS — H40.1123: ICD-10-CM

## 2024-02-07 DIAGNOSIS — H16.223: ICD-10-CM

## 2024-02-07 DIAGNOSIS — H01.002: ICD-10-CM

## 2024-02-07 DIAGNOSIS — H26.492: ICD-10-CM

## 2024-02-07 PROCEDURE — 92134 CPTRZ OPH DX IMG PST SGM RTA: CPT | Performed by: OPHTHALMOLOGY

## 2024-02-07 PROCEDURE — 99214 OFFICE O/P EST MOD 30 MIN: CPT | Performed by: OPHTHALMOLOGY

## 2024-02-07 ASSESSMENT — REFRACTION_MANIFEST
OD_VA1: 20/30
OD_CYLINDER: -1.25
OS_ADD: +2.75
OD_ADD: +2.75
OS_AXIS: 045
OD_AXIS: 115
OS_VA1: 20/30
OS_CYLINDER: -2.75
OD_SPHERE: +4.00
OD_SPHERE: +0.75
OS_CYLINDER: -3.25
OS_SPHERE: +0.75
OD_CYLINDER: -1.75
OS_SPHERE: +4.50
OS_AXIS: 035
OD_AXIS: 120

## 2024-02-07 ASSESSMENT — SUPERFICIAL PUNCTATE KERATITIS (SPK)
OD_SPK: 1+
OS_SPK: 1+

## 2024-02-07 ASSESSMENT — REFRACTION_CURRENTRX
OS_SPHERE: +1.00
OS_CYLINDER: -3.25
OS_AXIS: 035
OS_OVR_VA: 20/
OS_AXIS: 045
OS_CYLINDER: -1.25
OS_OVR_VA: 20/
OD_CYLINDER: -1.75
OD_SPHERE: +0.75
OD_OVR_VA: 20/
OD_CYLINDER: -0.75
OD_AXIS: 125
OD_OVR_VA: 20/
OS_SPHERE: +1.50
OD_SPHERE: +0.75
OD_AXIS: 110

## 2024-02-07 ASSESSMENT — LID EXAM ASSESSMENTS
OS_BLEPHARITIS: LLL LUL 2+
OD_BLEPHARITIS: RLL RUL 2+

## 2024-02-07 ASSESSMENT — SPHEQUIV_DERIVED
OD_SPHEQUIV: 0.125
OD_SPHEQUIV: -0.125
OS_SPHEQUIV: 2.875
OS_SPHEQUIV: -0.375
OS_SPHEQUIV: -0.625
OD_SPHEQUIV: 3.375

## 2024-02-07 ASSESSMENT — REFRACTION_AUTOREFRACTION
OD_CYLINDER: -1.25
OS_SPHERE: +1.00
OD_SPHERE: +0.75
OD_AXIS: 111
OS_AXIS: 034
OS_CYLINDER: -2.75

## 2024-02-07 ASSESSMENT — CONFRONTATIONAL VISUAL FIELD TEST (CVF)
OS_FINDINGS: FULL
OD_FINDINGS: FULL

## 2024-03-07 ENCOUNTER — OFFICE (OUTPATIENT)
Facility: LOCATION | Age: 74
Setting detail: OPHTHALMOLOGY
End: 2024-03-07
Payer: MEDICARE

## 2024-03-07 DIAGNOSIS — H01.001: ICD-10-CM

## 2024-03-07 DIAGNOSIS — H01.002: ICD-10-CM

## 2024-03-07 DIAGNOSIS — H52.4: ICD-10-CM

## 2024-03-07 DIAGNOSIS — H11.823: ICD-10-CM

## 2024-03-07 DIAGNOSIS — H26.492: ICD-10-CM

## 2024-03-07 DIAGNOSIS — H40.1111: ICD-10-CM

## 2024-03-07 DIAGNOSIS — H40.1123: ICD-10-CM

## 2024-03-07 DIAGNOSIS — H16.223: ICD-10-CM

## 2024-03-07 DIAGNOSIS — H01.005: ICD-10-CM

## 2024-03-07 DIAGNOSIS — H01.004: ICD-10-CM

## 2024-03-07 PROCEDURE — 92012 INTRM OPH EXAM EST PATIENT: CPT | Performed by: OPHTHALMOLOGY

## 2024-03-07 ASSESSMENT — REFRACTION_CURRENTRX
OS_SPHERE: +1.00
OS_SPHERE: +1.50
OD_CYLINDER: -0.75
OD_SPHERE: +0.75
OS_OVR_VA: 20/
OD_AXIS: 110
OS_AXIS: 035
OS_CYLINDER: -3.25
OS_OVR_VA: 20/
OS_AXIS: 045
OD_CYLINDER: -1.75
OD_OVR_VA: 20/
OD_SPHERE: +0.75
OD_OVR_VA: 20/
OS_CYLINDER: -1.25
OD_AXIS: 125

## 2024-03-07 ASSESSMENT — REFRACTION_MANIFEST
OS_CYLINDER: -3.25
OD_SPHERE: +4.00
OS_AXIS: 035
OD_AXIS: 115
OS_AXIS: 045
OS_ADD: +2.75
OD_CYLINDER: -1.75
OD_ADD: +2.75
OD_AXIS: 120
OS_SPHERE: +0.75
OS_SPHERE: +4.50
OD_CYLINDER: -1.25
OD_SPHERE: +0.75
OS_VA1: 20/30
OD_VA1: 20/30
OS_CYLINDER: -2.75

## 2024-03-07 ASSESSMENT — SPHEQUIV_DERIVED
OD_SPHEQUIV: -0.125
OS_SPHEQUIV: 2.875
OS_SPHEQUIV: -0.625
OD_SPHEQUIV: 3.375

## 2024-03-07 ASSESSMENT — LID EXAM ASSESSMENTS
OD_BLEPHARITIS: RLL RUL 2+
OS_BLEPHARITIS: LLL LUL 2+

## 2024-05-16 ENCOUNTER — OFFICE (OUTPATIENT)
Facility: LOCATION | Age: 74
Setting detail: OPHTHALMOLOGY
End: 2024-05-16
Payer: MEDICARE

## 2024-05-16 ENCOUNTER — RX ONLY (RX ONLY)
Age: 74
End: 2024-05-16

## 2024-05-16 DIAGNOSIS — H40.1111: ICD-10-CM

## 2024-05-16 DIAGNOSIS — H16.223: ICD-10-CM

## 2024-05-16 DIAGNOSIS — H40.1123: ICD-10-CM

## 2024-05-16 DIAGNOSIS — H01.001: ICD-10-CM

## 2024-05-16 DIAGNOSIS — H26.492: ICD-10-CM

## 2024-05-16 DIAGNOSIS — H11.823: ICD-10-CM

## 2024-05-16 DIAGNOSIS — H01.005: ICD-10-CM

## 2024-05-16 DIAGNOSIS — H52.4: ICD-10-CM

## 2024-05-16 DIAGNOSIS — H01.004: ICD-10-CM

## 2024-05-16 DIAGNOSIS — H01.002: ICD-10-CM

## 2024-05-16 PROCEDURE — 92012 INTRM OPH EXAM EST PATIENT: CPT | Performed by: OPHTHALMOLOGY

## 2024-05-16 ASSESSMENT — LID EXAM ASSESSMENTS
OS_BLEPHARITIS: LLL LUL 2+
OD_BLEPHARITIS: RLL RUL 2+

## 2024-05-16 ASSESSMENT — CONFRONTATIONAL VISUAL FIELD TEST (CVF)
OD_FINDINGS: FULL
OS_FINDINGS: FULL

## 2024-06-19 ENCOUNTER — RX ONLY (RX ONLY)
Age: 74
End: 2024-06-19

## 2024-06-19 ENCOUNTER — OFFICE (OUTPATIENT)
Dept: URBAN - METROPOLITAN AREA CLINIC 77 | Facility: CLINIC | Age: 74
Setting detail: OPHTHALMOLOGY
End: 2024-06-19
Payer: MEDICARE

## 2024-06-19 DIAGNOSIS — H47.212: ICD-10-CM

## 2024-06-19 DIAGNOSIS — H11.823: ICD-10-CM

## 2024-06-19 DIAGNOSIS — H16.223: ICD-10-CM

## 2024-06-19 DIAGNOSIS — H26.492: ICD-10-CM

## 2024-06-19 DIAGNOSIS — H01.004: ICD-10-CM

## 2024-06-19 DIAGNOSIS — H01.002: ICD-10-CM

## 2024-06-19 DIAGNOSIS — H40.1123: ICD-10-CM

## 2024-06-19 DIAGNOSIS — H01.001: ICD-10-CM

## 2024-06-19 DIAGNOSIS — H40.1111: ICD-10-CM

## 2024-06-19 DIAGNOSIS — H10.45: ICD-10-CM

## 2024-06-19 DIAGNOSIS — H35.352: ICD-10-CM

## 2024-06-19 DIAGNOSIS — H53.40: ICD-10-CM

## 2024-06-19 PROCEDURE — 92202 OPSCPY EXTND ON/MAC DRAW: CPT | Performed by: OPHTHALMOLOGY

## 2024-06-19 PROCEDURE — 99213 OFFICE O/P EST LOW 20 MIN: CPT | Performed by: OPHTHALMOLOGY

## 2024-06-19 PROCEDURE — 92134 CPTRZ OPH DX IMG PST SGM RTA: CPT | Performed by: OPHTHALMOLOGY

## 2024-06-19 ASSESSMENT — CONFRONTATIONAL VISUAL FIELD TEST (CVF)
OD_FINDINGS: FULL
OS_FINDINGS: FULL

## 2024-06-19 ASSESSMENT — LID EXAM ASSESSMENTS
OS_BLEPHARITIS: LLL LUL 2+
OD_BLEPHARITIS: RLL RUL 2+

## 2024-08-16 ENCOUNTER — OFFICE (OUTPATIENT)
Facility: LOCATION | Age: 74
Setting detail: OPHTHALMOLOGY
End: 2024-08-16
Payer: MEDICARE

## 2024-08-16 DIAGNOSIS — H16.223: ICD-10-CM

## 2024-08-16 DIAGNOSIS — H35.352: ICD-10-CM

## 2024-08-16 DIAGNOSIS — H53.40: ICD-10-CM

## 2024-08-16 DIAGNOSIS — H10.45: ICD-10-CM

## 2024-08-16 DIAGNOSIS — H40.1111: ICD-10-CM

## 2024-08-16 DIAGNOSIS — H47.212: ICD-10-CM

## 2024-08-16 DIAGNOSIS — H26.492: ICD-10-CM

## 2024-08-16 DIAGNOSIS — H11.823: ICD-10-CM

## 2024-08-16 DIAGNOSIS — H40.1123: ICD-10-CM

## 2024-08-16 DIAGNOSIS — H01.001: ICD-10-CM

## 2024-08-16 DIAGNOSIS — H01.002: ICD-10-CM

## 2024-08-16 DIAGNOSIS — H01.004: ICD-10-CM

## 2024-08-16 PROCEDURE — 99213 OFFICE O/P EST LOW 20 MIN: CPT | Performed by: OPHTHALMOLOGY

## 2024-08-16 ASSESSMENT — LID EXAM ASSESSMENTS
OS_BLEPHARITIS: LLL LUL 2+
OD_BLEPHARITIS: RLL RUL 2+

## 2024-08-16 ASSESSMENT — CONFRONTATIONAL VISUAL FIELD TEST (CVF)
OS_FINDINGS: FULL
OD_FINDINGS: FULL

## 2024-09-27 ENCOUNTER — OFFICE (OUTPATIENT)
Facility: LOCATION | Age: 74
Setting detail: OPHTHALMOLOGY
End: 2024-09-27
Payer: MEDICARE

## 2024-09-27 DIAGNOSIS — H16.223: ICD-10-CM

## 2024-09-27 DIAGNOSIS — H10.45: ICD-10-CM

## 2024-09-27 PROBLEM — H01.001 BLEPHARITIS; RIGHT UPPER LID, RIGHT LOWER LID, LEFT UPPER LID, LEFT LOWER LID: Status: ACTIVE | Noted: 2024-09-27

## 2024-09-27 PROBLEM — H01.002 BLEPHARITIS; RIGHT UPPER LID, RIGHT LOWER LID, LEFT UPPER LID, LEFT LOWER LID: Status: ACTIVE | Noted: 2024-09-27

## 2024-09-27 PROBLEM — H01.004 BLEPHARITIS; RIGHT UPPER LID, RIGHT LOWER LID, LEFT UPPER LID, LEFT LOWER LID: Status: ACTIVE | Noted: 2024-09-27

## 2024-09-27 PROBLEM — H01.005 BLEPHARITIS; RIGHT UPPER LID, RIGHT LOWER LID, LEFT UPPER LID, LEFT LOWER LID: Status: ACTIVE | Noted: 2024-09-27

## 2024-09-27 PROCEDURE — 99213 OFFICE O/P EST LOW 20 MIN: CPT | Performed by: OPHTHALMOLOGY

## 2024-09-27 ASSESSMENT — CONFRONTATIONAL VISUAL FIELD TEST (CVF)
OD_FINDINGS: FULL
OS_FINDINGS: FULL

## 2024-09-27 ASSESSMENT — LID EXAM ASSESSMENTS
OS_BLEPHARITIS: LLL LUL 2+
OD_BLEPHARITIS: RLL RUL 2+

## 2024-10-11 ENCOUNTER — OFFICE (OUTPATIENT)
Facility: LOCATION | Age: 74
Setting detail: OPHTHALMOLOGY
End: 2024-10-11
Payer: MEDICARE

## 2024-10-11 DIAGNOSIS — H40.1123: ICD-10-CM

## 2024-10-11 DIAGNOSIS — H11.823: ICD-10-CM

## 2024-10-11 DIAGNOSIS — H10.45: ICD-10-CM

## 2024-10-11 DIAGNOSIS — H40.1111: ICD-10-CM

## 2024-10-11 PROCEDURE — 99213 OFFICE O/P EST LOW 20 MIN: CPT | Performed by: OPHTHALMOLOGY

## 2024-10-11 ASSESSMENT — TONOMETRY
OS_IOP_MMHG: 14
OD_IOP_MMHG: 15

## 2024-10-11 ASSESSMENT — REFRACTION_CURRENTRX
OS_SPHERE: +1.00
OD_AXIS: 125
OS_SPHERE: +4.50
OS_OVR_VA: 20/
OD_CYLINDER: -1.25
OD_CYLINDER: -1.75
OD_AXIS: 120
OD_SPHERE: +4.00
OS_CYLINDER: -3.25
OS_OVR_VA: 20/
OS_CYLINDER: -3.25
OD_OVR_VA: 20/
OD_SPHERE: +0.75
OD_OVR_VA: 20/
OS_AXIS: 035
OS_AXIS: 045

## 2024-10-11 ASSESSMENT — REFRACTION_AUTOREFRACTION
OS_SPHERE: +1.00
OS_CYLINDER: -2.50
OS_AXIS: 032
OD_SPHERE: +0.75
OD_AXIS: 113
OD_CYLINDER: -1.00

## 2024-10-11 ASSESSMENT — CONFRONTATIONAL VISUAL FIELD TEST (CVF)
OS_FINDINGS: FULL
OD_FINDINGS: FULL

## 2024-10-11 ASSESSMENT — VISUAL ACUITY
OS_BCVA: 20/20-1
OD_BCVA: 20/25

## 2024-10-11 ASSESSMENT — REFRACTION_MANIFEST
OD_ADD: +3.00
OS_ADD: +2.75
OD_VA1: 20/30
OD_SPHERE: +3.50
OS_CYLINDER: -2.25
OS_SPHERE: +4.50
OD_ADD: +2.75
OD_AXIS: 110
OS_CYLINDER: -2.75
OS_VA1: 20/40
OD_SPHERE: +0.75
OD_CYLINDER: -1.25
OD_VA1: 20/20
OD_CYLINDER: -1.75
OD_AXIS: 110
OD_AXIS: 120
OD_SPHERE: +4.00
OS_VA1: 20/30
OS_SPHERE: +0.75
OS_AXIS: 035
OS_AXIS: 045
OS_AXIS: 040
OS_SPHERE: +1.25
OD_AXIS: 115
OS_AXIS: 040
OS_ADD: +3.00
OS_CYLINDER: -3.25
OS_CYLINDER: -2.25
OD_SPHERE: +1.00
OS_SPHERE: +4.00
OD_CYLINDER: -1.25
OD_CYLINDER: -1.25

## 2024-10-11 ASSESSMENT — KERATOMETRY
OS_K2POWER_DIOPTERS: 4.50
OD_AXISANGLE_DEGREES: 088
OS_AXISANGLE_DEGREES: 085
OD_K1POWER_DIOPTERS: 41.75
OS_K1POWER_DIOPTERS: 41.75
OD_K2POWER_DIOPTERS: 44.50

## 2024-10-11 ASSESSMENT — PACHYMETRY
OD_CT_CORRECTION: 2
OD_CT_UM: 513
OS_CT_UM: 515
OS_CT_CORRECTION: 2

## 2024-10-11 ASSESSMENT — SUPERFICIAL PUNCTATE KERATITIS (SPK)
OS_SPK: 1+
OD_SPK: 1+

## 2024-10-11 ASSESSMENT — LID EXAM ASSESSMENTS
OD_BLEPHARITIS: RLL RUL 2+
OS_BLEPHARITIS: LLL LUL 2+

## 2024-11-19 ENCOUNTER — RX ONLY (RX ONLY)
Age: 74
End: 2024-11-19

## 2024-11-19 ENCOUNTER — OFFICE (OUTPATIENT)
Facility: LOCATION | Age: 74
Setting detail: OPHTHALMOLOGY
End: 2024-11-19
Payer: MEDICARE

## 2024-11-19 DIAGNOSIS — H10.45: ICD-10-CM

## 2024-11-19 DIAGNOSIS — H11.823: ICD-10-CM

## 2024-11-19 DIAGNOSIS — H40.1111: ICD-10-CM

## 2024-11-19 DIAGNOSIS — H16.223: ICD-10-CM

## 2024-11-19 DIAGNOSIS — H40.1123: ICD-10-CM

## 2024-11-19 PROCEDURE — 99214 OFFICE O/P EST MOD 30 MIN: CPT | Performed by: OPHTHALMOLOGY

## 2024-11-19 PROCEDURE — 92083 EXTENDED VISUAL FIELD XM: CPT | Performed by: OPHTHALMOLOGY

## 2024-11-19 PROCEDURE — 92133 CPTRZD OPH DX IMG PST SGM ON: CPT | Performed by: OPHTHALMOLOGY

## 2024-11-19 ASSESSMENT — PACHYMETRY
OD_CT_UM: 513
OS_CT_CORRECTION: 2
OD_CT_CORRECTION: 2
OS_CT_UM: 515

## 2024-11-19 ASSESSMENT — REFRACTION_MANIFEST
OD_AXIS: 115
OD_CYLINDER: -1.25
OD_AXIS: 120
OS_CYLINDER: -2.25
OS_VA1: 20/40
OD_VA1: 20/20
OD_VA1: 20/30
OD_ADD: +3.00
OS_AXIS: 040
OD_SPHERE: +3.50
OS_ADD: +3.00
OS_CYLINDER: -2.25
OS_SPHERE: +0.75
OS_CYLINDER: -3.25
OS_AXIS: 035
OD_AXIS: 110
OS_CYLINDER: -2.75
OD_ADD: +2.75
OS_SPHERE: +4.00
OS_SPHERE: +1.25
OD_CYLINDER: -1.25
OD_AXIS: 110
OS_AXIS: 040
OD_SPHERE: +1.00
OD_CYLINDER: -1.25
OS_AXIS: 045
OD_SPHERE: +4.00
OS_SPHERE: +4.50
OD_CYLINDER: -1.75
OS_ADD: +2.75
OS_VA1: 20/30
OD_SPHERE: +0.75

## 2024-11-19 ASSESSMENT — TONOMETRY
OS_IOP_MMHG: 11
OD_IOP_MMHG: 14

## 2024-11-19 ASSESSMENT — REFRACTION_CURRENTRX
OS_OVR_VA: 20/
OD_OVR_VA: 20/
OD_AXIS: 120
OD_SPHERE: +3.50
OS_OVR_VA: 20/
OD_CYLINDER: -1.25
OD_SPHERE: +4.00
OS_AXIS: 036
OD_CYLINDER: -1.25
OS_AXIS: 045
OS_SPHERE: +4.00
OS_CYLINDER: -3.25
OD_OVR_VA: 20/
OD_AXIS: 111
OS_SPHERE: +4.50
OS_CYLINDER: -2.25

## 2024-11-19 ASSESSMENT — REFRACTION_AUTOREFRACTION
OD_AXIS: 116
OS_CYLINDER: -2.75
OD_CYLINDER: -1.25
OS_SPHERE: +1.00
OS_AXIS: 041
OD_SPHERE: +0.75

## 2024-11-19 ASSESSMENT — KERATOMETRY
OS_AXISANGLE_DEGREES: 095
OD_K2POWER_DIOPTERS: 44.50
OS_K1POWER_DIOPTERS: 41.50
OD_K1POWER_DIOPTERS: 41.75
OS_K2POWER_DIOPTERS: 44.25
OD_AXISANGLE_DEGREES: 088

## 2024-11-19 ASSESSMENT — CONFRONTATIONAL VISUAL FIELD TEST (CVF)
OD_FINDINGS: FULL
OS_FINDINGS: FULL

## 2024-11-19 ASSESSMENT — SUPERFICIAL PUNCTATE KERATITIS (SPK)
OS_SPK: 1+
OD_SPK: 1+

## 2024-11-19 ASSESSMENT — LID EXAM ASSESSMENTS
OS_BLEPHARITIS: LLL LUL 2+
OD_BLEPHARITIS: RLL RUL 2+

## 2024-11-19 ASSESSMENT — VISUAL ACUITY
OS_BCVA: 20/25
OD_BCVA: 20/150

## 2025-01-21 ENCOUNTER — OFFICE (OUTPATIENT)
Facility: LOCATION | Age: 75
Setting detail: OPHTHALMOLOGY
End: 2025-01-21
Payer: MEDICARE

## 2025-01-21 ENCOUNTER — RX ONLY (RX ONLY)
Age: 75
End: 2025-01-21

## 2025-01-21 DIAGNOSIS — H11.823: ICD-10-CM

## 2025-01-21 DIAGNOSIS — H16.223: ICD-10-CM

## 2025-01-21 DIAGNOSIS — H40.1111: ICD-10-CM

## 2025-01-21 DIAGNOSIS — H40.1123: ICD-10-CM

## 2025-01-21 DIAGNOSIS — H10.45: ICD-10-CM

## 2025-01-21 PROCEDURE — 92012 INTRM OPH EXAM EST PATIENT: CPT | Performed by: OPHTHALMOLOGY

## 2025-01-21 ASSESSMENT — REFRACTION_MANIFEST
OS_SPHERE: +4.00
OD_CYLINDER: -1.25
OD_AXIS: 115
OS_SPHERE: +4.50
OS_CYLINDER: -2.75
OD_AXIS: 120
OD_ADD: +3.00
OS_AXIS: 045
OD_CYLINDER: -1.75
OD_CYLINDER: -1.25
OD_AXIS: 110
OS_VA1: 20/30
OD_SPHERE: +1.00
OS_SPHERE: +0.75
OD_VA1: 20/30
OS_AXIS: 040
OS_ADD: +3.00
OD_VA1: 20/20
OS_ADD: +2.75
OS_SPHERE: +0.75
OS_CYLINDER: -2.25
OS_CYLINDER: -3.25
OD_SPHERE: +0.75
OD_AXIS: 120
OD_CYLINDER: -1.25
OS_SPHERE: +1.25
OS_AXIS: 040
OD_ADD: +2.75
OD_SPHERE: +4.00
OS_VA1: 20/40
OS_AXIS: 035
OS_CYLINDER: -2.75
OD_AXIS: 110
OD_CYLINDER: -1.75
OS_CYLINDER: -2.25
OD_SPHERE: +1.25
OD_SPHERE: +3.50
OS_AXIS: 040

## 2025-01-21 ASSESSMENT — LID EXAM ASSESSMENTS
OS_BLEPHARITIS: LLL LUL 2+
OD_BLEPHARITIS: RLL RUL 2+

## 2025-01-21 ASSESSMENT — KERATOMETRY
OS_AXISANGLE_DEGREES: 090
OD_K2POWER_DIOPTERS: 44.00
OS_K1POWER_DIOPTERS: 41.50
OD_K1POWER_DIOPTERS: 41.75
OS_K2POWER_DIOPTERS: 44.00
OD_AXISANGLE_DEGREES: 085

## 2025-01-21 ASSESSMENT — CONFRONTATIONAL VISUAL FIELD TEST (CVF)
OD_FINDINGS: FULL
OS_FINDINGS: FULL

## 2025-01-21 ASSESSMENT — VISUAL ACUITY
OD_BCVA: 20/60+
OS_BCVA: 20/40+

## 2025-01-21 ASSESSMENT — REFRACTION_AUTOREFRACTION
OD_SPHERE: +1.25
OD_AXIS: 120
OS_SPHERE: +0.75
OD_CYLINDER: -1.75
OS_AXIS: 040
OS_CYLINDER: -2.75

## 2025-01-21 ASSESSMENT — TONOMETRY
OS_IOP_MMHG: 13
OD_IOP_MMHG: 16

## 2025-01-21 ASSESSMENT — PACHYMETRY
OD_CT_CORRECTION: 2
OS_CT_CORRECTION: 2
OS_CT_UM: 515
OD_CT_UM: 513

## 2025-01-21 ASSESSMENT — REFRACTION_CURRENTRX
OD_OVR_VA: 20/
OD_CYLINDER: -1.25
OS_AXIS: 036
OD_CYLINDER: -1.25
OS_CYLINDER: -2.25
OS_AXIS: 045
OS_SPHERE: +4.00
OS_CYLINDER: -3.25
OS_OVR_VA: 20/
OS_SPHERE: +4.50
OS_OVR_VA: 20/
OD_OVR_VA: 20/
OD_SPHERE: +4.00
OD_AXIS: 111
OD_AXIS: 120
OD_SPHERE: +3.50

## 2025-01-21 ASSESSMENT — SUPERFICIAL PUNCTATE KERATITIS (SPK)
OD_SPK: 1+
OS_SPK: 1+

## 2025-03-20 ENCOUNTER — OFFICE (OUTPATIENT)
Facility: LOCATION | Age: 75
Setting detail: OPHTHALMOLOGY
End: 2025-03-20
Payer: MEDICARE

## 2025-03-20 DIAGNOSIS — H10.45: ICD-10-CM

## 2025-03-20 DIAGNOSIS — H40.1123: ICD-10-CM

## 2025-03-20 DIAGNOSIS — H40.1111: ICD-10-CM

## 2025-03-20 DIAGNOSIS — H16.223: ICD-10-CM

## 2025-03-20 DIAGNOSIS — H11.823: ICD-10-CM

## 2025-03-20 PROCEDURE — 99214 OFFICE O/P EST MOD 30 MIN: CPT | Performed by: OPHTHALMOLOGY

## 2025-03-20 ASSESSMENT — REFRACTION_AUTOREFRACTION
OD_CYLINDER: -1.00
OD_AXIS: 131
OS_AXIS: 038
OD_SPHERE: +0.75
OS_SPHERE: +0.75
OS_CYLINDER: -2.50

## 2025-03-20 ASSESSMENT — REFRACTION_CURRENTRX
OS_CYLINDER: -2.25
OS_SPHERE: +4.00
OS_AXIS: 036
OS_OVR_VA: 20/
OD_CYLINDER: -1.25
OS_AXIS: 045
OD_OVR_VA: 20/
OD_OVR_VA: 20/
OS_CYLINDER: -3.25
OS_SPHERE: +4.50
OD_SPHERE: +4.00
OS_OVR_VA: 20/
OD_AXIS: 111
OD_CYLINDER: -1.25
OD_AXIS: 120
OD_SPHERE: +3.50

## 2025-03-20 ASSESSMENT — REFRACTION_MANIFEST
OD_AXIS: 115
OS_AXIS: 040
OS_AXIS: 035
OD_VA1: 20/20
OD_CYLINDER: -1.25
OD_ADD: +2.75
OS_VA1: 20/30
OD_SPHERE: +1.00
OS_CYLINDER: -2.75
OS_SPHERE: +1.25
OS_CYLINDER: -2.25
OS_VA1: 20/40
OS_SPHERE: +0.75
OD_AXIS: 120
OS_ADD: +2.75
OS_CYLINDER: -2.25
OS_AXIS: 045
OD_CYLINDER: -1.75
OD_SPHERE: +1.25
OS_CYLINDER: -3.25
OD_SPHERE: +3.50
OS_CYLINDER: -2.75
OS_SPHERE: +4.00
OS_AXIS: 040
OD_CYLINDER: -1.75
OD_SPHERE: +4.00
OS_SPHERE: +0.75
OD_CYLINDER: -1.25
OS_SPHERE: +4.50
OD_AXIS: 110
OD_SPHERE: +0.75
OS_ADD: +3.00
OD_CYLINDER: -1.25
OD_AXIS: 110
OS_AXIS: 040
OD_ADD: +3.00
OD_AXIS: 120
OD_VA1: 20/30

## 2025-03-20 ASSESSMENT — SUPERFICIAL PUNCTATE KERATITIS (SPK)
OD_SPK: 1+
OS_SPK: 1+

## 2025-03-20 ASSESSMENT — VISUAL ACUITY
OD_BCVA: 20/60+
OS_BCVA: 20/30-2

## 2025-03-20 ASSESSMENT — CONFRONTATIONAL VISUAL FIELD TEST (CVF)
OD_FINDINGS: FULL
OS_FINDINGS: FULL

## 2025-03-20 ASSESSMENT — PACHYMETRY
OD_CT_CORRECTION: 2
OS_CT_UM: 515
OS_CT_CORRECTION: 2
OD_CT_UM: 513

## 2025-03-20 ASSESSMENT — KERATOMETRY
OS_AXISANGLE_DEGREES: 090
OS_K1POWER_DIOPTERS: 41.50
OD_K1POWER_DIOPTERS: 42.00
OD_K2POWER_DIOPTERS: 44.50
OS_K2POWER_DIOPTERS: 44.50
OD_AXISANGLE_DEGREES: 093

## 2025-03-20 ASSESSMENT — LID EXAM ASSESSMENTS
OS_BLEPHARITIS: LLL LUL 2+
OD_BLEPHARITIS: RLL RUL 2+

## 2025-04-21 ENCOUNTER — OFFICE (OUTPATIENT)
Facility: LOCATION | Age: 75
Setting detail: OPHTHALMOLOGY
End: 2025-04-21
Payer: MEDICARE

## 2025-04-21 DIAGNOSIS — H10.45: ICD-10-CM

## 2025-04-21 DIAGNOSIS — H16.223: ICD-10-CM

## 2025-04-21 DIAGNOSIS — H40.1123: ICD-10-CM

## 2025-04-21 DIAGNOSIS — H11.31: ICD-10-CM

## 2025-04-21 DIAGNOSIS — H11.823: ICD-10-CM

## 2025-04-21 DIAGNOSIS — H40.1111: ICD-10-CM

## 2025-04-21 PROCEDURE — 99213 OFFICE O/P EST LOW 20 MIN: CPT | Performed by: OPHTHALMOLOGY

## 2025-04-21 ASSESSMENT — REFRACTION_MANIFEST
OD_AXIS: 110
OD_SPHERE: +1.25
OS_VA1: 20/30
OD_ADD: +2.75
OS_CYLINDER: -2.75
OS_SPHERE: +0.75
OS_ADD: +3.00
OD_ADD: +3.00
OD_VA1: 20/20
OD_CYLINDER: -1.25
OS_AXIS: 040
OS_SPHERE: +4.00
OS_AXIS: 040
OS_AXIS: 035
OS_CYLINDER: -2.75
OS_CYLINDER: -3.25
OS_ADD: +2.75
OD_SPHERE: +4.00
OD_SPHERE: +1.00
OD_AXIS: 120
OD_CYLINDER: -1.75
OS_VA1: 20/40
OD_VA1: 20/30
OS_AXIS: 045
OS_CYLINDER: -2.25
OS_SPHERE: +0.75
OD_CYLINDER: -1.75
OD_SPHERE: +3.50
OS_AXIS: 040
OD_SPHERE: +0.75
OD_AXIS: 110
OS_SPHERE: +4.50
OD_CYLINDER: -1.25
OD_CYLINDER: -1.25
OD_AXIS: 115
OD_AXIS: 120
OS_SPHERE: +1.25
OS_CYLINDER: -2.25

## 2025-04-21 ASSESSMENT — LID EXAM ASSESSMENTS
OD_BLEPHARITIS: RLL RUL 2+
OS_BLEPHARITIS: LLL LUL 2+

## 2025-04-21 ASSESSMENT — REFRACTION_CURRENTRX
OD_CYLINDER: -1.25
OD_CYLINDER: -1.25
OD_AXIS: 111
OS_CYLINDER: -3.25
OS_AXIS: 045
OD_SPHERE: +4.00
OS_CYLINDER: -2.25
OS_OVR_VA: 20/
OS_SPHERE: +4.00
OS_AXIS: 036
OD_OVR_VA: 20/
OD_OVR_VA: 20/
OD_AXIS: 120
OD_SPHERE: +3.50
OS_SPHERE: +4.50
OS_OVR_VA: 20/

## 2025-04-21 ASSESSMENT — PACHYMETRY
OD_CT_CORRECTION: 2
OD_CT_UM: 513
OS_CT_UM: 515
OS_CT_CORRECTION: 2

## 2025-04-21 ASSESSMENT — REFRACTION_AUTOREFRACTION
OS_CYLINDER: -2.25
OD_SPHERE: +1.00
OS_SPHERE: +1.25
OS_AXIS: 044
OD_CYLINDER: -1.50
OD_AXIS: 118

## 2025-04-21 ASSESSMENT — CONFRONTATIONAL VISUAL FIELD TEST (CVF)
OS_FINDINGS: FULL
OD_FINDINGS: FULL

## 2025-04-21 ASSESSMENT — SUPERFICIAL PUNCTATE KERATITIS (SPK)
OS_SPK: 1+
OD_SPK: 1+

## 2025-04-21 ASSESSMENT — VISUAL ACUITY
OS_BCVA: 20/30-2
OD_BCVA: 20/100

## 2025-04-21 ASSESSMENT — KERATOMETRY
OD_AXISANGLE_DEGREES: 087
OD_K1POWER_DIOPTERS: 41.75
OS_K2POWER_DIOPTERS: 44.00
OS_K1POWER_DIOPTERS: 41.25
OD_K2POWER_DIOPTERS: 44.25
OS_AXISANGLE_DEGREES: 086

## 2025-04-28 ENCOUNTER — OFFICE (OUTPATIENT)
Dept: URBAN - METROPOLITAN AREA CLINIC 77 | Facility: CLINIC | Age: 75
Setting detail: OPHTHALMOLOGY
End: 2025-04-28
Payer: MEDICARE

## 2025-04-28 DIAGNOSIS — H40.1123: ICD-10-CM

## 2025-04-28 DIAGNOSIS — H11.823: ICD-10-CM

## 2025-04-28 DIAGNOSIS — H10.45: ICD-10-CM

## 2025-04-28 DIAGNOSIS — H01.004: ICD-10-CM

## 2025-04-28 DIAGNOSIS — H47.212: ICD-10-CM

## 2025-04-28 DIAGNOSIS — H11.31: ICD-10-CM

## 2025-04-28 DIAGNOSIS — H35.352: ICD-10-CM

## 2025-04-28 DIAGNOSIS — H01.002: ICD-10-CM

## 2025-04-28 DIAGNOSIS — H40.1111: ICD-10-CM

## 2025-04-28 DIAGNOSIS — H01.001: ICD-10-CM

## 2025-04-28 DIAGNOSIS — H52.4: ICD-10-CM

## 2025-04-28 DIAGNOSIS — H16.223: ICD-10-CM

## 2025-04-28 PROCEDURE — 99213 OFFICE O/P EST LOW 20 MIN: CPT | Performed by: OPHTHALMOLOGY

## 2025-04-28 PROCEDURE — 92134 CPTRZ OPH DX IMG PST SGM RTA: CPT | Performed by: OPHTHALMOLOGY

## 2025-04-28 PROCEDURE — 92202 OPSCPY EXTND ON/MAC DRAW: CPT | Performed by: OPHTHALMOLOGY

## 2025-04-28 ASSESSMENT — SUPERFICIAL PUNCTATE KERATITIS (SPK)
OD_SPK: 1+
OS_SPK: 1+

## 2025-04-28 ASSESSMENT — CONFRONTATIONAL VISUAL FIELD TEST (CVF)
OD_FINDINGS: FULL
OS_FINDINGS: FULL

## 2025-04-28 ASSESSMENT — LID EXAM ASSESSMENTS
OD_BLEPHARITIS: RLL RUL 2+
OS_BLEPHARITIS: LLL LUL 2+

## 2025-04-30 ASSESSMENT — REFRACTION_MANIFEST
OS_VA1: 20/30
OS_CYLINDER: -2.75
OD_AXIS: 110
OD_ADD: +2.75
OS_SPHERE: +0.75
OD_VA1: 20/30
OD_AXIS: 120
OS_AXIS: 040
OD_CYLINDER: -1.75
OS_ADD: +2.75
OS_ADD: +3.00
OS_SPHERE: +4.00
OD_CYLINDER: -1.25
OS_CYLINDER: -3.25
OD_CYLINDER: -1.25
OD_AXIS: 120
OS_SPHERE: +1.25
OD_AXIS: 115
OS_CYLINDER: -2.25
OD_SPHERE: +4.00
OD_SPHERE: +3.50
OS_CYLINDER: -2.75
OS_CYLINDER: -2.25
OS_AXIS: 040
OD_CYLINDER: -1.25
OS_AXIS: 040
OD_VA1: 20/20
OD_SPHERE: +0.75
OD_ADD: +3.00
OD_AXIS: 110
OD_SPHERE: +1.00
OS_AXIS: 035
OD_CYLINDER: -1.75
OS_SPHERE: +0.75
OS_AXIS: 045
OS_VA1: 20/40
OS_SPHERE: +4.50
OD_SPHERE: +1.25

## 2025-04-30 ASSESSMENT — REFRACTION_AUTOREFRACTION
OD_AXIS: 118
OS_SPHERE: +1.25
OD_SPHERE: +1.00
OD_CYLINDER: -1.50
OS_AXIS: 044
OS_CYLINDER: -2.25

## 2025-04-30 ASSESSMENT — VISUAL ACUITY
OS_BCVA: 20/40-
OD_BCVA: 20/40

## 2025-04-30 ASSESSMENT — REFRACTION_CURRENTRX
OD_CYLINDER: -1.25
OS_CYLINDER: -2.25
OS_SPHERE: +4.00
OS_SPHERE: +4.50
OD_SPHERE: +4.00
OS_AXIS: 045
OD_SPHERE: +3.50
OD_OVR_VA: 20/
OD_AXIS: 111
OD_OVR_VA: 20/
OS_OVR_VA: 20/
OS_CYLINDER: -3.25
OS_OVR_VA: 20/
OD_CYLINDER: -1.25
OD_AXIS: 120
OS_AXIS: 036

## 2025-05-21 ASSESSMENT — REFRACTION_CURRENTRX
OS_AXIS: 036
OS_OVR_VA: 20/
OD_SPHERE: +4.00
OD_OVR_VA: 20/
OD_AXIS: 111
OD_OVR_VA: 20/
OD_CYLINDER: -1.25
OD_CYLINDER: -1.25
OS_CYLINDER: -3.25
OD_SPHERE: +3.50
OS_AXIS: 045
OS_SPHERE: +4.00
OS_CYLINDER: -2.25
OS_OVR_VA: 20/
OD_AXIS: 120
OS_SPHERE: +4.50

## 2025-05-21 ASSESSMENT — REFRACTION_MANIFEST
OS_ADD: +2.75
OS_CYLINDER: -2.25
OS_CYLINDER: -2.75
OS_SPHERE: +4.00
OD_CYLINDER: -1.75
OD_SPHERE: +1.25
OD_VA1: 20/30
OS_CYLINDER: -3.25
OS_AXIS: 040
OS_CYLINDER: -2.25
OS_VA1: 20/30
OS_AXIS: 040
OS_AXIS: 045
OS_ADD: +3.00
OS_SPHERE: +0.75
OD_AXIS: 120
OD_SPHERE: +4.00
OS_VA1: 20/40
OS_SPHERE: +0.75
OD_AXIS: 120
OS_CYLINDER: -2.75
OD_VA1: 20/20
OD_CYLINDER: -1.25
OS_AXIS: 040
OD_SPHERE: +1.00
OD_AXIS: 110
OD_AXIS: 110
OD_SPHERE: +0.75
OD_SPHERE: +3.50
OD_ADD: +3.00
OD_AXIS: 115
OS_SPHERE: +4.50
OS_SPHERE: +1.25
OD_ADD: +2.75
OD_CYLINDER: -1.25
OD_CYLINDER: -1.75
OD_CYLINDER: -1.25
OS_AXIS: 035

## 2025-05-21 ASSESSMENT — KERATOMETRY
OD_AXISANGLE_DEGREES: 087
OS_AXISANGLE_DEGREES: 086
OS_K2POWER_DIOPTERS: 44.00
OS_K1POWER_DIOPTERS: 41.25
OD_K2POWER_DIOPTERS: 44.25
OD_K1POWER_DIOPTERS: 41.75

## 2025-06-10 ENCOUNTER — OFFICE (OUTPATIENT)
Facility: LOCATION | Age: 75
Setting detail: OPHTHALMOLOGY
End: 2025-06-10
Payer: MEDICARE

## 2025-06-10 ENCOUNTER — RX ONLY (RX ONLY)
Age: 75
End: 2025-06-10

## 2025-06-10 DIAGNOSIS — H16.223: ICD-10-CM

## 2025-06-10 DIAGNOSIS — H01.001: ICD-10-CM

## 2025-06-10 DIAGNOSIS — H01.004: ICD-10-CM

## 2025-06-10 DIAGNOSIS — H11.823: ICD-10-CM

## 2025-06-10 DIAGNOSIS — H40.1123: ICD-10-CM

## 2025-06-10 DIAGNOSIS — H40.1111: ICD-10-CM

## 2025-06-10 DIAGNOSIS — H01.005: ICD-10-CM

## 2025-06-10 DIAGNOSIS — H01.002: ICD-10-CM

## 2025-06-10 PROCEDURE — 99213 OFFICE O/P EST LOW 20 MIN: CPT | Performed by: OPHTHALMOLOGY

## 2025-06-10 ASSESSMENT — REFRACTION_MANIFEST
OS_AXIS: 035
OD_CYLINDER: -1.75
OS_AXIS: 040
OS_SPHERE: +1.25
OD_ADD: +3.00
OD_AXIS: 120
OS_SPHERE: +4.00
OD_SPHERE: +0.75
OD_VA1: 20/30
OS_CYLINDER: -2.25
OS_AXIS: 045
OS_AXIS: 040
OD_SPHERE: +4.00
OS_SPHERE: +4.50
OS_CYLINDER: -2.75
OD_CYLINDER: -1.75
OS_CYLINDER: -2.25
OS_AXIS: 040
OS_CYLINDER: -3.25
OD_AXIS: 115
OS_SPHERE: +0.75
OS_CYLINDER: -2.75
OD_SPHERE: +1.25
OD_CYLINDER: -1.25
OD_ADD: +2.75
OD_AXIS: 110
OD_SPHERE: +3.50
OS_VA1: 20/40
OD_SPHERE: +1.00
OS_SPHERE: +0.75
OS_ADD: +2.75
OS_ADD: +3.00
OD_VA1: 20/20
OD_AXIS: 110
OD_CYLINDER: -1.25
OD_CYLINDER: -1.25
OS_VA1: 20/30
OD_AXIS: 120

## 2025-06-10 ASSESSMENT — TONOMETRY
OS_IOP_MMHG: 13
OD_IOP_MMHG: 20

## 2025-06-10 ASSESSMENT — PACHYMETRY
OD_CT_UM: 513
OS_CT_UM: 515
OS_CT_CORRECTION: 2
OD_CT_CORRECTION: 2

## 2025-06-10 ASSESSMENT — REFRACTION_CURRENTRX
OS_OVR_VA: 20/
OS_CYLINDER: -2.25
OD_AXIS: 120
OD_AXIS: 111
OD_OVR_VA: 20/
OD_SPHERE: +4.00
OS_OVR_VA: 20/
OD_OVR_VA: 20/
OS_SPHERE: +4.50
OD_CYLINDER: -1.25
OD_SPHERE: +3.50
OS_AXIS: 045
OS_CYLINDER: -3.25
OS_SPHERE: +4.00
OD_CYLINDER: -1.25
OS_AXIS: 036

## 2025-06-10 ASSESSMENT — REFRACTION_AUTOREFRACTION
OS_SPHERE: +1.50
OD_SPHERE: +0.75
OS_AXIS: 040
OD_AXIS: 115
OD_CYLINDER: -1.00
OS_CYLINDER: -2.50

## 2025-06-10 ASSESSMENT — SUPERFICIAL PUNCTATE KERATITIS (SPK)
OD_SPK: 1+
OS_SPK: 1+

## 2025-06-10 ASSESSMENT — VISUAL ACUITY
OS_BCVA: 20/20-
OD_BCVA: 20/60

## 2025-06-10 ASSESSMENT — KERATOMETRY
OS_K1POWER_DIOPTERS: 41.50
OS_K2POWER_DIOPTERS: 44.50
OS_AXISANGLE_DEGREES: 090
OD_K1POWER_DIOPTERS: 41.75
OD_K2POWER_DIOPTERS: 44.25
OD_AXISANGLE_DEGREES: 089

## 2025-06-10 ASSESSMENT — LID EXAM ASSESSMENTS
OD_BLEPHARITIS: RLL RUL 2+
OS_BLEPHARITIS: LLL LUL 2+

## 2025-06-10 ASSESSMENT — CONFRONTATIONAL VISUAL FIELD TEST (CVF)
OD_FINDINGS: FULL
OS_FINDINGS: FULL

## 2025-06-25 ENCOUNTER — OFFICE (OUTPATIENT)
Dept: URBAN - METROPOLITAN AREA CLINIC 77 | Facility: CLINIC | Age: 75
Setting detail: OPHTHALMOLOGY
End: 2025-06-25
Payer: MEDICARE

## 2025-06-25 ENCOUNTER — RX ONLY (RX ONLY)
Age: 75
End: 2025-06-25

## 2025-06-25 DIAGNOSIS — H35.352: ICD-10-CM

## 2025-06-25 DIAGNOSIS — H47.212: ICD-10-CM

## 2025-06-25 DIAGNOSIS — H01.004: ICD-10-CM

## 2025-06-25 DIAGNOSIS — H26.492: ICD-10-CM

## 2025-06-25 DIAGNOSIS — H40.1123: ICD-10-CM

## 2025-06-25 DIAGNOSIS — H01.002: ICD-10-CM

## 2025-06-25 DIAGNOSIS — H40.1111: ICD-10-CM

## 2025-06-25 DIAGNOSIS — H01.005: ICD-10-CM

## 2025-06-25 DIAGNOSIS — H01.001: ICD-10-CM

## 2025-06-25 DIAGNOSIS — E11.9: ICD-10-CM

## 2025-06-25 DIAGNOSIS — H11.823: ICD-10-CM

## 2025-06-25 DIAGNOSIS — H16.223: ICD-10-CM

## 2025-06-25 PROCEDURE — 99213 OFFICE O/P EST LOW 20 MIN: CPT | Performed by: OPHTHALMOLOGY

## 2025-06-25 PROCEDURE — 92083 EXTENDED VISUAL FIELD XM: CPT | Performed by: OPHTHALMOLOGY

## 2025-06-25 PROCEDURE — 92250 FUNDUS PHOTOGRAPHY W/I&R: CPT | Performed by: OPHTHALMOLOGY

## 2025-06-25 ASSESSMENT — REFRACTION_MANIFEST
OD_CYLINDER: -1.75
OD_AXIS: 110
OD_SPHERE: +4.00
OS_SPHERE: +1.25
OD_AXIS: 120
OS_AXIS: 040
OD_CYLINDER: -1.25
OS_AXIS: 045
OS_SPHERE: +4.50
OD_VA1: 20/20
OS_CYLINDER: -2.25
OS_CYLINDER: -2.75
OS_SPHERE: +0.75
OD_AXIS: 120
OD_SPHERE: +0.75
OD_ADD: +2.75
OD_VA1: 20/30
OS_ADD: +2.75
OD_SPHERE: +1.25
OS_VA1: 20/30
OS_VA1: 20/40
OS_SPHERE: +0.75
OD_AXIS: 110
OS_AXIS: 040
OS_CYLINDER: -3.25
OD_SPHERE: +3.50
OS_CYLINDER: -2.75
OS_AXIS: 035
OD_SPHERE: +1.00
OD_AXIS: 115
OD_ADD: +3.00
OS_AXIS: 040
OS_SPHERE: +4.00
OD_CYLINDER: -1.25
OD_CYLINDER: -1.25
OD_CYLINDER: -1.75
OS_ADD: +3.00
OS_CYLINDER: -2.25

## 2025-06-25 ASSESSMENT — REFRACTION_CURRENTRX
OD_OVR_VA: 20/
OD_CYLINDER: -1.25
OS_SPHERE: +4.50
OD_OVR_VA: 20/
OD_AXIS: 111
OS_OVR_VA: 20/
OS_CYLINDER: -3.25
OS_SPHERE: +4.00
OS_AXIS: 036
OD_SPHERE: +3.50
OD_AXIS: 120
OS_CYLINDER: -2.25
OS_OVR_VA: 20/
OD_SPHERE: +4.00
OS_AXIS: 045
OD_CYLINDER: -1.25

## 2025-06-25 ASSESSMENT — TONOMETRY
OS_IOP_MMHG: 13
OD_IOP_MMHG: 19

## 2025-06-25 ASSESSMENT — LID EXAM ASSESSMENTS
OD_BLEPHARITIS: RLL RUL 2+
OS_BLEPHARITIS: LLL LUL 2+

## 2025-06-25 ASSESSMENT — REFRACTION_AUTOREFRACTION
OS_AXIS: 040
OS_SPHERE: +1.50
OD_SPHERE: +0.75
OS_CYLINDER: -2.50
OD_CYLINDER: -1.00
OD_AXIS: 115

## 2025-06-25 ASSESSMENT — KERATOMETRY
OD_K1POWER_DIOPTERS: 41.75
OD_K2POWER_DIOPTERS: 44.25
OS_AXISANGLE_DEGREES: 090
OS_K1POWER_DIOPTERS: 41.50
OS_K2POWER_DIOPTERS: 44.50
OD_AXISANGLE_DEGREES: 089

## 2025-06-25 ASSESSMENT — SUPERFICIAL PUNCTATE KERATITIS (SPK)
OS_SPK: 1+
OD_SPK: 1+

## 2025-06-25 ASSESSMENT — CONFRONTATIONAL VISUAL FIELD TEST (CVF)
OS_FINDINGS: FULL
OD_FINDINGS: FULL

## 2025-06-25 ASSESSMENT — PACHYMETRY
OS_CT_CORRECTION: 2
OD_CT_UM: 513
OS_CT_UM: 515
OD_CT_CORRECTION: 2

## 2025-06-25 ASSESSMENT — VISUAL ACUITY
OS_BCVA: 20/25-
OD_BCVA: 20/40